# Patient Record
Sex: FEMALE | Race: BLACK OR AFRICAN AMERICAN
[De-identification: names, ages, dates, MRNs, and addresses within clinical notes are randomized per-mention and may not be internally consistent; named-entity substitution may affect disease eponyms.]

---

## 2021-12-16 ENCOUNTER — TRANSCRIPTION ENCOUNTER (OUTPATIENT)
Age: 76
End: 2021-12-16

## 2021-12-17 PROBLEM — Z00.00 ENCOUNTER FOR PREVENTIVE HEALTH EXAMINATION: Status: ACTIVE | Noted: 2021-12-17

## 2021-12-20 ENCOUNTER — EMERGENCY (EMERGENCY)
Facility: HOSPITAL | Age: 76
LOS: 0 days | Discharge: HOME | End: 2021-12-20
Attending: EMERGENCY MEDICINE
Payer: MEDICARE

## 2021-12-20 VITALS
HEART RATE: 94 BPM | SYSTOLIC BLOOD PRESSURE: 177 MMHG | OXYGEN SATURATION: 96 % | DIASTOLIC BLOOD PRESSURE: 67 MMHG | RESPIRATION RATE: 18 BRPM

## 2021-12-20 VITALS
HEART RATE: 105 BPM | RESPIRATION RATE: 18 BRPM | SYSTOLIC BLOOD PRESSURE: 197 MMHG | WEIGHT: 149.91 LBS | DIASTOLIC BLOOD PRESSURE: 72 MMHG | OXYGEN SATURATION: 96 % | HEIGHT: 64 IN | TEMPERATURE: 98 F

## 2021-12-20 DIAGNOSIS — Z88.0 ALLERGY STATUS TO PENICILLIN: ICD-10-CM

## 2021-12-20 DIAGNOSIS — E11.65 TYPE 2 DIABETES MELLITUS WITH HYPERGLYCEMIA: ICD-10-CM

## 2021-12-20 DIAGNOSIS — Z79.4 LONG TERM (CURRENT) USE OF INSULIN: ICD-10-CM

## 2021-12-20 PROCEDURE — 93010 ELECTROCARDIOGRAM REPORT: CPT

## 2021-12-20 PROCEDURE — 99283 EMERGENCY DEPT VISIT LOW MDM: CPT

## 2021-12-20 PROCEDURE — 99284 EMERGENCY DEPT VISIT MOD MDM: CPT

## 2021-12-20 PROCEDURE — 82962 GLUCOSE BLOOD TEST: CPT

## 2021-12-20 PROCEDURE — 93005 ELECTROCARDIOGRAM TRACING: CPT

## 2021-12-20 NOTE — ED STATDOCS - OBJECTIVE STATEMENT
75 y/o female visiting from [place] with a PMHx of DM on insulin presents to the ED for hyperglycemia. Pt was at urgent care on 12/16 for left arm pain and was placed on steroids. Pt reports hyperglycemia since yesterday. Denies fevers. No other complaints at this time.

## 2021-12-20 NOTE — ED STATDOCS - CLINICAL SUMMARY MEDICAL DECISION MAKING FREE TEXT BOX
Pt with elevated blood sugar secondary to recent steroids for arm pain. Will instruct pt on sliding scale for the next few days, return for worsening concerns.

## 2021-12-20 NOTE — ED STATDOCS - PATIENT PORTAL LINK FT
You can access the FollowMyHealth Patient Portal offered by [facility] by registering at the following website: [email or web address]. By joining [employer]’s FollowMyHealth portal, you will also be able to view your health information using other applications (apps) compatible with our system.

## 2021-12-20 NOTE — ED STATDOCS - PROGRESS NOTE DETAILS
77 y/o Female with HTN, IDDM presents to ED c./o noticing that her Glucose level was elevated over 300 today.   Pt reports that she went to  on 12/16 and was started on new meds for chronic shoulder pain.  Pt started a medrol dosepak and Valtaren gel.  Pt without symptoms of nausea, vomiting, dizziness ,polyuria, polydipsia, blurry vision, chest pain, fevers, open wounds.  Pt reports she feels fine other than sugar being elevated.  Explained to pt that elevated sugar is a result of Steroid medication that was started for her shoulder pain.  Pt usually takes 5Units of 70/30 Insulin BID.  With Obdulio Doan, we explained that she can take 6.5 Units BID today.  Then she can monitor sugar tomorrow.  Likely plan to return 5 Units BID.  Increase fluids.  Glucose will return to normal after finishing Steroids.  Josefina Eugene PA-C

## 2021-12-22 ENCOUNTER — APPOINTMENT (OUTPATIENT)
Dept: ORTHOPEDIC SURGERY | Facility: CLINIC | Age: 76
End: 2021-12-22
Payer: MEDICARE

## 2021-12-22 ENCOUNTER — NON-APPOINTMENT (OUTPATIENT)
Age: 76
End: 2021-12-22

## 2021-12-22 VITALS
DIASTOLIC BLOOD PRESSURE: 76 MMHG | HEIGHT: 63 IN | WEIGHT: 150 LBS | SYSTOLIC BLOOD PRESSURE: 197 MMHG | BODY MASS INDEX: 26.58 KG/M2 | HEART RATE: 67 BPM

## 2021-12-22 DIAGNOSIS — Z78.9 OTHER SPECIFIED HEALTH STATUS: ICD-10-CM

## 2021-12-22 DIAGNOSIS — Z86.39 PERSONAL HISTORY OF OTHER ENDOCRINE, NUTRITIONAL AND METABOLIC DISEASE: ICD-10-CM

## 2021-12-22 DIAGNOSIS — Z83.3 FAMILY HISTORY OF DIABETES MELLITUS: ICD-10-CM

## 2021-12-22 DIAGNOSIS — Z86.69 PERSONAL HISTORY OF OTHER DISEASES OF THE NERVOUS SYSTEM AND SENSE ORGANS: ICD-10-CM

## 2021-12-22 DIAGNOSIS — Z86.79 PERSONAL HISTORY OF OTHER DISEASES OF THE CIRCULATORY SYSTEM: ICD-10-CM

## 2021-12-22 DIAGNOSIS — M19.012 PRIMARY OSTEOARTHRITIS, LEFT SHOULDER: ICD-10-CM

## 2021-12-22 DIAGNOSIS — Z86.2 PERSONAL HISTORY OF DISEASES OF THE BLOOD AND BLOOD-FORMING ORGANS AND CERTAIN DISORDERS INVOLVING THE IMMUNE MECHANISM: ICD-10-CM

## 2021-12-22 PROBLEM — E11.9 TYPE 2 DIABETES MELLITUS WITHOUT COMPLICATIONS: Chronic | Status: ACTIVE | Noted: 2021-12-20

## 2021-12-22 PROCEDURE — 73030 X-RAY EXAM OF SHOULDER: CPT | Mod: LT

## 2021-12-22 PROCEDURE — 99203 OFFICE O/P NEW LOW 30 MIN: CPT

## 2021-12-23 ENCOUNTER — APPOINTMENT (OUTPATIENT)
Dept: NEUROSURGERY | Facility: CLINIC | Age: 76
End: 2021-12-23
Payer: MEDICARE

## 2021-12-23 ENCOUNTER — RESULT REVIEW (OUTPATIENT)
Age: 76
End: 2021-12-23

## 2021-12-23 VITALS
HEART RATE: 69 BPM | HEIGHT: 63 IN | SYSTOLIC BLOOD PRESSURE: 180 MMHG | OXYGEN SATURATION: 99 % | WEIGHT: 150 LBS | DIASTOLIC BLOOD PRESSURE: 90 MMHG | BODY MASS INDEX: 26.58 KG/M2 | TEMPERATURE: 96.8 F

## 2021-12-23 PROBLEM — Z86.69 HISTORY OF HEARING LOSS: Status: RESOLVED | Noted: 2021-12-23 | Resolved: 2021-12-23

## 2021-12-23 PROBLEM — Z86.39 HISTORY OF HIGH CHOLESTEROL: Status: RESOLVED | Noted: 2021-12-23 | Resolved: 2021-12-23

## 2021-12-23 PROBLEM — M19.012 PRIMARY OSTEOARTHRITIS OF LEFT SHOULDER: Status: ACTIVE | Noted: 2021-12-23

## 2021-12-23 PROBLEM — Z83.3 FAMILY HISTORY OF DIABETES MELLITUS: Status: ACTIVE | Noted: 2021-12-23

## 2021-12-23 PROBLEM — Z86.39 HISTORY OF DIABETES MELLITUS: Status: RESOLVED | Noted: 2021-12-23 | Resolved: 2021-12-23

## 2021-12-23 PROBLEM — Z86.2 HISTORY OF ANEMIA: Status: RESOLVED | Noted: 2021-12-23 | Resolved: 2021-12-23

## 2021-12-23 PROBLEM — Z78.9 NON-SMOKER: Status: ACTIVE | Noted: 2021-12-23

## 2021-12-23 PROBLEM — Z86.79 HISTORY OF HYPERTENSION: Status: RESOLVED | Noted: 2021-12-23 | Resolved: 2021-12-23

## 2021-12-23 PROCEDURE — 99204 OFFICE O/P NEW MOD 45 MIN: CPT

## 2021-12-23 NOTE — CONSULT LETTER
[Dear  ___] : Dear  [unfilled], [Courtesy Letter:] : I had the pleasure of seeing your patient, [unfilled], in my office today. [Sincerely,] : Sincerely, [FreeTextEntry2] : Jose L Hernandez MD\par [address]\[place] [address] [FreeTextEntry1] : Mrs. Alcala is a very pleasant 76-year-old female patient who was seen in our office today in regards to left-sided upper extremity symptoms.  The patient was accompanied by her daughter at today's visit.\par \par The patient is a somewhat difficult historian.  The patient does not recall specific details and the patient's daughter does try to help fill in some gaps.  The patient endorses arm swelling in October 2021.  The patient's daughter clarified that she had first noticed problems with her mother's arm at that time but suspects that it has been going on for several months before.  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  The patient denies any fevers or chills or any other recent infections.  The patient denies any other neurologic conditions outside of her right upper extremity.  The patient states that she has been having difficulty moving her entire arm including shoulder movements as well as elbow and wrist movements.  The patient states that he also experienced pain in numbness in the left arm but this has since resolved.  Currently, the patient complains primarily of weakness in the right upper extremity.  The patient denies any new headaches, facial weakness, or difficulties in the lower extremities bilaterally.\par \par The patient denies any medical issues, but her most recent hospital visit for hyperglycemia documents diabetes mellitus.  The patient states she does have allergies to penicillin, but she does not know the reaction.\par \par On examination, the patient is alert, oriented, and compliant with the exam.  The patient demonstrates full 5/5 strength in the upper extremity on the right as well as in the lower extremities bilaterally.  The patient demonstrates 2+ reflexes in the left upper extremity and at the patella and Achilles tendons bilaterally.  The patient ambulates well.  On examination of the left upper extremity, the patient has significant atrophy in the forearm and has little to no movement with wrist extension on the left.  The patient retains finger flexion though this is significantly weaker than the right.  The patient also has finger extension but is unable to fully extend her fingers and again she is weaker compared to the right.  The patient's bicep remains relatively strong rated at 4/5 on the left.  The patient shoulder abduction is rated at a 4/5 but the patient does have difficulty abducting her shoulder beyond 90 degrees.  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  However, given the history of trauma around the brachial plexus, lower motor neuron issue cannot fully be discounted.  As such, I have recommended MRI scans of the head and cervical spine in addition to the brachial plexus.  I have also requested electrophysiologic studies to evaluate nerve function peripherally.  Finally, because of the history of swelling I have also recommended evaluation for possible peripheral vascular disease, but my suspicion is low.  At this time, I have recommended follow-up with us as soon as her tests and images are complete so that we can evaluate them together and develop a targeted treatment plan.

## 2021-12-27 NOTE — HISTORY OF PRESENT ILLNESS
[de-identified] : The patient comes in today stating she had a fall in September and is having persistent complaints of pain to her left shoulder.  She has noticed subsequently, she is having pain radiating down her arm into her hand with some limited use of her hand.  The patient states the onset/injury occurred 09/01/2021.  This injury is not work related or due to an automobile accident.  The patient states the pain is intermittent.  The patient describes the pain as dull. [2] : a current pain level of 2/10 [de-identified] : lifting arm [de-identified] : massage

## 2021-12-27 NOTE — CONSULT LETTER
[Dear  ___] : Dear  [unfilled], [Referral Letter:] : I am referring [unfilled] to you for further evaluation.  My most recent evaluation follows. [Please see my note below.] : Please see my note below. [Referral Closing:] : Thank you very much for seeing this patient.  If you have any questions, please do not hesitate to contact me. [Sincerely,] : Sincerely, [FreeTextEntry3] : Osbaldo Camilo, III, MD\par

## 2021-12-27 NOTE — PHYSICAL EXAM
[Normal] : Gait: normal [de-identified] : Right Shoulder: \par Shoulder: Range of Motion in Degrees:   	\par    	                        Claimant:          Normal:  	\par Abduction (Active)  	180  	180 degrees  	\par Abduction (Passive)  	180  	180 degrees  	\par Forward elevation (Active):  	180  	180 degrees  	\par Forward elevation (Passive):  180  	180 degrees  	\par External rotation (Active):  	45  	45 degrees  	\par External rotation (Passive):  	45  	45 degrees  	\par Internal rotation (Active):  	L-1  	L-1  	\par Internal rotation (Passive):  	L-1  	L-1  	\par \par No motor weakness to internal rotation, external rotation or abduction in the scapular plane.  Negative crank test.  Negative O’Vin’s test.  Negative Speed’s test. Negative Yergason’s test.  Negative cross arm test.  No tenderness to palpation at the AC joint. Negative Hawkin’s sign.  Negative Neer’s sign.  Negative apprehension. Negative sulcus sign.  No gross neurological or vascular deficits distally.  Skin is intact.  No rashes, scars or lesions.  2+ radial and ulnar pulses.  No extra-articular swelling or tenderness. \par \par Left Shoulder: \par Shoulder: Range of Motion in Degrees:  	\par 	                                  Claimant:	Normal:	\par Abduction (Active)	                  180	               180 degrees	\par Abduction (Passive)	  180	               180 degrees	\par Forward elevation (Active):	  180	               180 degrees	\par Forward elevation (Passive):	  180	               180 degrees	\par External rotation (Active):	   45	               45 degrees	\par External rotation (Passive):	   45	               45 degrees	\par Internal rotation (Active):	   L-1	               L-1	\par Internal rotation (Passive):	   L-1	               L-1	\par \par Diffuse swelling, decreased sensation and limited motion into her hand.  Diffuse crepitations and tenderness throughout range of motion.  Pain and swelling throughout range of motion, more significant at extremes.  No motor weakness to internal rotation, external rotation or abduction in the scapular plane.  Negative crank test.  Negative O’Vin’s test.  Negative Speed’s test.  Negative Yergason’s test.  Negative cross arm test.  No tenderness to palpation at the AC joint.  Negative Hawkin’s sign.  Negative Neer’s sign.  Negative apprehension. Negative sulcus sign.  No gross neurological or vascular deficits distally.  Skin is intact.  No rashes, scars or lesions.  2+ radial and ulnar pulses. No extra-articular swelling or tenderness.\par  [de-identified] : Station:  Normal. [de-identified] : Appearance:  Well-developed, well-nourished female in no acute distress.\par   [de-identified] : Radiographs, two views of the left shoulder, show no obvious osseous abnormalities.

## 2021-12-27 NOTE — DISCUSSION/SUMMARY
[de-identified] : The patient presents with resolving arthritic complaints to her left shoulder.  I think her symptoms are more consistent with a radiculopathic process.  At this time, the patient will be referred for cervical spine evaluation.

## 2022-01-25 ENCOUNTER — APPOINTMENT (OUTPATIENT)
Dept: MRI IMAGING | Facility: CLINIC | Age: 77
End: 2022-01-25
Payer: MEDICARE

## 2022-01-25 ENCOUNTER — OUTPATIENT (OUTPATIENT)
Dept: OUTPATIENT SERVICES | Facility: HOSPITAL | Age: 77
LOS: 1 days | End: 2022-01-25
Payer: COMMERCIAL

## 2022-01-25 DIAGNOSIS — R29.898 OTHER SYMPTOMS AND SIGNS INVOLVING THE MUSCULOSKELETAL SYSTEM: ICD-10-CM

## 2022-01-25 PROCEDURE — 70551 MRI BRAIN STEM W/O DYE: CPT | Mod: 26

## 2022-01-25 PROCEDURE — 72141 MRI NECK SPINE W/O DYE: CPT

## 2022-01-25 PROCEDURE — 72141 MRI NECK SPINE W/O DYE: CPT | Mod: 26

## 2022-01-25 PROCEDURE — 70551 MRI BRAIN STEM W/O DYE: CPT

## 2022-01-27 ENCOUNTER — APPOINTMENT (OUTPATIENT)
Dept: MRI IMAGING | Facility: CLINIC | Age: 77
End: 2022-01-27
Payer: MEDICARE

## 2022-01-27 ENCOUNTER — APPOINTMENT (OUTPATIENT)
Dept: ULTRASOUND IMAGING | Facility: CLINIC | Age: 77
End: 2022-01-27
Payer: MEDICARE

## 2022-01-27 ENCOUNTER — OUTPATIENT (OUTPATIENT)
Dept: OUTPATIENT SERVICES | Facility: HOSPITAL | Age: 77
LOS: 1 days | End: 2022-01-27

## 2022-01-27 DIAGNOSIS — R29.898 OTHER SYMPTOMS AND SIGNS INVOLVING THE MUSCULOSKELETAL SYSTEM: ICD-10-CM

## 2022-01-27 PROCEDURE — 93931 UPPER EXTREMITY STUDY: CPT | Mod: 26,LT

## 2022-01-27 PROCEDURE — 71550 MRI CHEST W/O DYE: CPT | Mod: 26

## 2022-02-17 ENCOUNTER — APPOINTMENT (OUTPATIENT)
Dept: NEUROSURGERY | Facility: CLINIC | Age: 77
End: 2022-02-17
Payer: MEDICARE

## 2022-02-17 VITALS
SYSTOLIC BLOOD PRESSURE: 186 MMHG | HEART RATE: 84 BPM | OXYGEN SATURATION: 100 % | WEIGHT: 150 LBS | TEMPERATURE: 96.5 F | BODY MASS INDEX: 26.58 KG/M2 | DIASTOLIC BLOOD PRESSURE: 73 MMHG | HEIGHT: 63 IN

## 2022-02-17 DIAGNOSIS — R29.898 OTHER SYMPTOMS AND SIGNS INVOLVING THE MUSCULOSKELETAL SYSTEM: ICD-10-CM

## 2022-02-17 PROCEDURE — 99215 OFFICE O/P EST HI 40 MIN: CPT

## 2022-02-17 NOTE — CONSULT LETTER
[Dear  ___] : Dear  [unfilled], [Courtesy Letter:] : I had the pleasure of seeing your patient, [unfilled], in my office today. [Sincerely,] : Sincerely, [FreeTextEntry2] : Jose L Hernandez MD\par [address]\[place] [address] [FreeTextEntry1] : Mrs. Alcala is a very pleasant 76-year-old female patient who was seen in our office today in follow-up. The patient was previously assessed in regards to left-sided upper extremity symptoms including pain, weakness, and atrophy. The patient was accompanied by her daughter.\par \par The patient continues to be a somewhat difficult historian. The patient states that she noticed upper extremity symptoms starting around September 2021. The patient states that she had noticed a significant decrease in function over the last several months which has now been stable over the last several weeks. At the last visit, the patient was actually complaining of upper extremity symptoms in the left and the right, but at today's visit the patient states that all of her symptoms are on the left upper extremity. The patient states that her left sided symptoms have not changed significantly since her last visit 2 months ago. Based on the physical examination and her history, the patient was organized an MRI scan of the brain, brachial plexus, and cervical spine. These images were reviewed today.\par \par On examination, the patient remains alert, oriented, and compliant with the exam. The patient has some difficulty following discussion, but the patient's daughter is able to help her mother understand the situation. The patient continues to demonstrate full strength in the lower extremities bilaterally as well as the right upper extremity. The patient's left upper extremity continues to demonstrate atrophy in the forearm and essentially no movement with wrist extension on the left. The patient demonstrates 4/5 finger flexion and is unable to extend her fingers fully on the left. Elbow extension on the left and shoulder abduction on the left is rated at 4/5. The patient previously did not have a Hussein sign noted, but currently demonstrates a positive Hussein sign on the left but not the right. The patient is hyperreflexic in the left upper extremity. The patient ambulates well. The patient's lower extremity symptoms demonstrate 2+ reflexes.\par \par The patient is accompanied with an MRI scan of the brachial plexus on the left dated January 27, 2022 which returned unremarkable.  The patient's MRI scan of the brain performed on January 25, 2022 similarly returned unremarkable.  In the cervical spine MRI scans performed on January 25, 2022, the patient has evidence of severe degenerative disc disease and multilevel cervical stenosis.  These findings are worst at C4-C7 where there are large central disc herniations causing deformation of the spinal cord on the left. 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 At this time, I  explained to the patient she is a candidate for surgical intervention which, in my hands, would entail an anterior cervical decompression and fusion from C4-7. Given the small height of the C5 vertebral body and the extensive compression behind the C5 body, consideration would be given to C5 corpectomy and a C6/7 discectomy. The purpose of this procedure would be to arrest the progression of the patient's symptoms and provide the patient the best chance of recovery of function of the left arm. Again, the patient appears to have slight difficulty following the discussion and the patient would like to discuss surgery with her family. I think this is very reasonable at this time and I have recommended follow-up with us in approximately 2 weeks to reevaluate her progress. In the interim, I have recommended physical therapy for upper extremity strengthening exercises to mitigate muscle disuse and contractures. [FreeTextEntry3] : Richie Zavala MD, PhD, FRCPSC \par Attending Neurosurgeon \par [facility] \par [address]nd floor \[place] [address] \par Office: [phone] \par Fax: [phone]\par \par

## 2022-04-14 ENCOUNTER — APPOINTMENT (OUTPATIENT)
Dept: NEUROSURGERY | Facility: CLINIC | Age: 77
End: 2022-04-14
Payer: MEDICARE

## 2022-04-14 VITALS
SYSTOLIC BLOOD PRESSURE: 161 MMHG | OXYGEN SATURATION: 98 % | DIASTOLIC BLOOD PRESSURE: 81 MMHG | WEIGHT: 150 LBS | BODY MASS INDEX: 26.58 KG/M2 | TEMPERATURE: 97.2 F | HEART RATE: 78 BPM | HEIGHT: 63 IN

## 2022-04-14 PROCEDURE — 99215 OFFICE O/P EST HI 40 MIN: CPT

## 2022-04-14 NOTE — CONSULT LETTER
[Dear  ___] : Dear  [unfilled], [Courtesy Letter:] : I had the pleasure of seeing your patient, [unfilled], in my office today. [Sincerely,] : Sincerely, [FreeTextEntry2] : Jose L Hernandez MD\par [address]\[place] [address]  [FreeTextEntry1] : Mrs. Alcala is a very pleasant  76-year-old female patient who was seen in our office today in follow-up in regards to left-sided upper extremity symptoms.  The patient was again accompanied by her daughter at this visit.\par \par Briefly, the patient was previously assessed and diagnosed with cervical myelopathy and left-sided upper extremity radiculopathies secondary to multilevel disc herniations and cord compression in the cervical spine from C4-C7.  The patient was offered surgical management of this condition but wished to attempt physical therapy first to see if there would be any improvement in her upper extremity function.  It has been approximately 8 weeks since her last visit.  At this time, the patient states that she believes the left hand has improved somewhat though she remains quite disabled. The patient does not endorse any new symptoms in the right upper extremity or the lower extremities bilaterally.  \par \par On examination, the patient remains alert, oriented, and compliant with the exam.  The patient continues to demonstrate full strength in the lower extremities as well as the right upper extremity.  The patient's left upper extremity continues to demonstrate significant atrophy throughout the arm.  The patient's wrist extension is rated at 0/5.  The patient's finger flexion is rated at a 3/5.  The patient's elbow extension and shoulder abduction is rated at a 3/5.  The patient continues to have a Hussein sign on the left.  The patient is quite hyperreflexic in the left upper extremity rated at 3+.\par \par I have no new imaging to review today.  The patient's previous images from January 25, 2022 demonstrates severe degenerative disc disease from C4-7 causing cord compression and evidence of myelomalacia.\par \par Taken together, the patient's clinical situation remains stable.  The patient has not regained significant function in the left upper extremity and remains quite weak.  The patient continues to have pain radiating down the left arm.  I explained to the patient that surgical intervention remains an option for the patient which would entail a C5 corpectomy and a C6/7 anterior cervical decompression and fusion.  At this time, the patient would still like to defer surgical treatment which is her prerogative.  The patient's daughter has a firm understanding of the situation and I have reiterated to the patient that surgical intervention is designed to give the patient the best chance at recovering function in the left upper extremity but given the chronicity, is unlikely to restore full function in that upper extremity.  The patient has requested to follow-up with us on an as-needed basis at this time.

## 2022-07-07 ENCOUNTER — TRANSCRIPTION ENCOUNTER (OUTPATIENT)
Age: 77
End: 2022-07-07

## 2022-08-31 ENCOUNTER — OUTPATIENT (OUTPATIENT)
Dept: OUTPATIENT SERVICES | Facility: HOSPITAL | Age: 77
LOS: 1 days | End: 2022-08-31
Payer: MEDICARE

## 2022-08-31 DIAGNOSIS — D64.9 ANEMIA, UNSPECIFIED: ICD-10-CM

## 2022-08-31 PROCEDURE — 86850 RBC ANTIBODY SCREEN: CPT

## 2022-08-31 PROCEDURE — 86900 BLOOD TYPING SEROLOGIC ABO: CPT

## 2022-08-31 PROCEDURE — 36415 COLL VENOUS BLD VENIPUNCTURE: CPT

## 2022-08-31 PROCEDURE — 86901 BLOOD TYPING SEROLOGIC RH(D): CPT

## 2022-09-01 ENCOUNTER — OUTPATIENT (OUTPATIENT)
Dept: OUTPATIENT SERVICES | Facility: HOSPITAL | Age: 77
LOS: 1 days | End: 2022-09-01
Payer: MEDICARE

## 2022-09-01 VITALS
HEIGHT: 62 IN | OXYGEN SATURATION: 100 % | TEMPERATURE: 99 F | RESPIRATION RATE: 17 BRPM | WEIGHT: 126.1 LBS | DIASTOLIC BLOOD PRESSURE: 53 MMHG | HEART RATE: 83 BPM | SYSTOLIC BLOOD PRESSURE: 137 MMHG

## 2022-09-01 VITALS
DIASTOLIC BLOOD PRESSURE: 59 MMHG | TEMPERATURE: 98 F | SYSTOLIC BLOOD PRESSURE: 142 MMHG | HEART RATE: 72 BPM | OXYGEN SATURATION: 100 % | RESPIRATION RATE: 16 BRPM

## 2022-09-01 DIAGNOSIS — D64.9 ANEMIA, UNSPECIFIED: ICD-10-CM

## 2022-09-01 PROCEDURE — 36430 TRANSFUSION BLD/BLD COMPNT: CPT

## 2022-09-01 PROCEDURE — 86920 COMPATIBILITY TEST SPIN: CPT

## 2022-09-01 PROCEDURE — 86900 BLOOD TYPING SEROLOGIC ABO: CPT

## 2022-09-01 PROCEDURE — 86901 BLOOD TYPING SEROLOGIC RH(D): CPT

## 2022-09-01 PROCEDURE — P9016: CPT

## 2022-09-01 PROCEDURE — 36415 COLL VENOUS BLD VENIPUNCTURE: CPT

## 2022-09-01 RX ORDER — ACETAMINOPHEN 500 MG
650 TABLET ORAL ONCE
Refills: 0 | Status: COMPLETED | OUTPATIENT
Start: 2022-09-01 | End: 2022-09-01

## 2022-09-01 RX ORDER — DIPHENHYDRAMINE HCL 50 MG
25 CAPSULE ORAL ONCE
Refills: 0 | Status: COMPLETED | OUTPATIENT
Start: 2022-09-01 | End: 2022-09-01

## 2022-09-01 RX ADMIN — Medication 650 MILLIGRAM(S): at 08:39

## 2022-09-01 RX ADMIN — Medication 650 MILLIGRAM(S): at 08:40

## 2022-09-01 RX ADMIN — Medication 25 MILLIGRAM(S): at 08:39

## 2022-10-06 ENCOUNTER — APPOINTMENT (OUTPATIENT)
Dept: NEUROSURGERY | Facility: CLINIC | Age: 77
End: 2022-10-06

## 2022-10-06 VITALS — HEART RATE: 85 BPM | OXYGEN SATURATION: 97 % | DIASTOLIC BLOOD PRESSURE: 81 MMHG | SYSTOLIC BLOOD PRESSURE: 169 MMHG

## 2022-10-06 DIAGNOSIS — M62.58 MUSCLE WASTING AND ATROPHY, NOT ELSEWHERE CLASSIFIED, OTHER SITE: ICD-10-CM

## 2022-10-06 DIAGNOSIS — R29.898 OTHER SYMPTOMS AND SIGNS INVOLVING THE MUSCULOSKELETAL SYSTEM: ICD-10-CM

## 2022-10-06 DIAGNOSIS — M54.12 RADICULOPATHY, CERVICAL REGION: ICD-10-CM

## 2022-10-06 PROCEDURE — 99215 OFFICE O/P EST HI 40 MIN: CPT

## 2022-10-11 PROBLEM — M54.12 CERVICAL RADICULAR PAIN: Status: ACTIVE | Noted: 2021-12-23

## 2022-10-11 PROBLEM — R29.898 LEFT ARM WEAKNESS: Status: ACTIVE | Noted: 2022-01-27

## 2022-10-11 PROBLEM — M62.58 ARM MUSCLE ATROPHY: Status: ACTIVE | Noted: 2021-12-23

## 2022-10-11 NOTE — CONSULT LETTER
[Dear  ___] : Dear  [unfilled], [Courtesy Letter:] : I had the pleasure of seeing your patient, [unfilled], in my office today. [Sincerely,] : Sincerely, [FreeTextEntry2] : Jose L Hernandez MD [address] [facility] [address] [place] [address]   [FreeTextEntry1] : Ms. Alcala is a very pleasant 77-year-old female patient who was seen in our office today in follow-up.\par \par The patient has left-sided upper extremity weakness which is thought to be a result of a chronic left-sided multilevel radiculopathy.  The patient is a poor historian and it is not clear how long her weakness has been present.  According to the patient's daughter, the weakness has been present for at least a year or more.  At our previous visits, the patient had been offered surgical intervention to decompress the nerve roots to allow her the best chance of functional recovery but the patient has thus far defer this treatment.  The patient has been participating with physical therapy but has noticed that her upper extremity weakness has worsened.  The patient's daughter also has several concerns about her mother's cognition and memory and has requested a neurology consult.  The patient is also being investigated for a form of autoimmune anemia at another healthcare system.\par \par On examination, the patient is alert, oriented, and compliant with the exam.  The patient left-sided upper extremity exam continues to demonstrate 4/5 strength with shoulder abduction, but the patient is unable to abduct beyond 45 degrees.  The patient demonstrates 4/5 strength with elbow flexion on the left.  The patient demonstrates 0/5 strength with wrist extension on the left.  The patient demonstrates 2-3/5 finger flexion on the left.  The patient is able to extend her fingers slightly but not fully.  The patient does not have a Hussein sign but is hyperreflexic at the biceps, triceps, and brachioradialis tendons on the left.\par \par The patient is not accompanied with new imaging at this time.\par \par Taken together, the patient continues to demonstrate severe upper extremity weakness on the left most likely related to nerve root compression.  Given the presence of atrophy and a longstanding weakness, I explained to the patient surgical intervention remains an option but it is not certain how much functional recovery the patient might enjoy.  However, given that the patient is worsening, one of the goals of surgical intervention would be to at least arrest development of worsening weakness.  Unfortunately, at this time, I am unsure as to whether or not the patient fully appreciates her conversation.  The patient's daughter agrees and we are again deferring surgical intervention until the patient has an assessment by a neurologist for the possibility of dementia.  I have recommended follow-up with us after the patient's medical issues are better explored but I have reiterated to the patient and her family that she remains a surgical candidate at any time. [FreeTextEntry3] : Richie Zavala MD, PhD, CS, FAANS Attending Neurosurgeon  of Neurosurgery [facility] [employer] [facility] at [facility] at [address], [place] [address] Office: [phone] Fax: [phone]

## 2022-11-12 ENCOUNTER — INPATIENT (INPATIENT)
Facility: HOSPITAL | Age: 77
LOS: 10 days | Discharge: HOSPICE | DRG: 853 | End: 2022-11-23
Attending: INTERNAL MEDICINE | Admitting: INTERNAL MEDICINE
Payer: MEDICARE

## 2022-11-12 VITALS — WEIGHT: 119.93 LBS | HEIGHT: 62 IN

## 2022-11-12 PROCEDURE — 93010 ELECTROCARDIOGRAM REPORT: CPT

## 2022-11-12 PROCEDURE — 99285 EMERGENCY DEPT VISIT HI MDM: CPT

## 2022-11-12 RX ORDER — VANCOMYCIN HCL 1 G
750 VIAL (EA) INTRAVENOUS EVERY 12 HOURS
Refills: 0 | Status: DISCONTINUED | OUTPATIENT
Start: 2022-11-13 | End: 2022-11-13

## 2022-11-12 RX ORDER — AZTREONAM 2 G
VIAL (EA) INJECTION
Refills: 0 | Status: DISCONTINUED | OUTPATIENT
Start: 2022-11-13 | End: 2022-11-13

## 2022-11-12 RX ORDER — VANCOMYCIN HCL 1 G
VIAL (EA) INTRAVENOUS
Refills: 0 | Status: DISCONTINUED | OUTPATIENT
Start: 2022-11-13 | End: 2022-11-13

## 2022-11-12 RX ORDER — ACETAMINOPHEN 500 MG
650 TABLET ORAL ONCE
Refills: 0 | Status: DISCONTINUED | OUTPATIENT
Start: 2022-11-12 | End: 2022-11-12

## 2022-11-12 RX ORDER — SODIUM CHLORIDE 9 MG/ML
1700 INJECTION INTRAMUSCULAR; INTRAVENOUS; SUBCUTANEOUS ONCE
Refills: 0 | Status: COMPLETED | OUTPATIENT
Start: 2022-11-12 | End: 2022-11-12

## 2022-11-12 RX ORDER — ACETAMINOPHEN 500 MG
1000 TABLET ORAL ONCE
Refills: 0 | Status: COMPLETED | OUTPATIENT
Start: 2022-11-12 | End: 2022-11-12

## 2022-11-12 RX ORDER — VANCOMYCIN HCL 1 G
750 VIAL (EA) INTRAVENOUS ONCE
Refills: 0 | Status: COMPLETED | OUTPATIENT
Start: 2022-11-12 | End: 2022-11-13

## 2022-11-12 RX ADMIN — SODIUM CHLORIDE 1700 MILLILITER(S): 9 INJECTION INTRAMUSCULAR; INTRAVENOUS; SUBCUTANEOUS at 23:56

## 2022-11-12 NOTE — ED ADULT NURSE NOTE - NSIMPLEMENTINTERV_GEN_ALL_ED
Implemented All Fall Risk Interventions:  [place] to call system. Call bell, personal items and telephone within reach. Instruct patient to call for assistance. Room bathroom lighting operational. Non-slip footwear when patient is off stretcher. Physically safe environment: no spills, clutter or unnecessary equipment. Stretcher in lowest position, wheels locked, appropriate side rails in place. Provide visual cue, wrist band, yellow gown, etc. Monitor gait and stability. Monitor for mental status changes and reorient to person, place, and time. Review medications for side effects contributing to fall risk. Reinforce activity limits and safety measures with patient and family.

## 2022-11-12 NOTE — ED ADULT TRIAGE NOTE - CHIEF COMPLAINT QUOTE
patient presenting via wheelchair to ED with daughter c/o increasing weakness x1 week and fever starting today. tmax 101. daughter states patient is currently being followed by oncologist dr. chery after finding masses on liver, spleen and enlarged lymph nodes. patient had CT and PET scan earlier this week but did not receive results yet. no hx cancer. patient has pinched nerve on left shoulder, patient unable to move left arm at baseline. patient [place]. patient acting at her baseline in triage according to daughter.

## 2022-11-12 NOTE — ED ADULT NURSE NOTE - CHIEF COMPLAINT QUOTE
patient presenting via wheelchair to ED with daughter c/o increasing weakness x1 week and fever starting today. tmax 101. daughter states patient is currently being followed by oncologist dr. chery after finding masses on liver, spleen and enlarged lymph nodes. patient had CT and PET scan earlier this week but did not receive results yet. no hx cancer. patient has pinched nerve on left shoulder, patient unable to move left arm at baseline. patient [place]. patient acting at her baseline in triage according to daughter.

## 2022-11-13 DIAGNOSIS — D64.9 ANEMIA, UNSPECIFIED: ICD-10-CM

## 2022-11-13 LAB
ALBUMIN SERPL ELPH-MCNC: 1.3 G/DL — LOW (ref 3.3–5)
ALP SERPL-CCNC: 250 U/L — HIGH (ref 40–120)
ALT FLD-CCNC: 14 U/L — SIGNIFICANT CHANGE UP (ref 12–78)
ANION GAP SERPL CALC-SCNC: 3 MMOL/L — LOW (ref 5–17)
APPEARANCE UR: CLEAR — SIGNIFICANT CHANGE UP
APTT BLD: 32.6 SEC — SIGNIFICANT CHANGE UP (ref 27.5–35.5)
AST SERPL-CCNC: 38 U/L — HIGH (ref 15–37)
BASOPHILS # BLD AUTO: 0.05 K/UL — SIGNIFICANT CHANGE UP (ref 0–0.2)
BASOPHILS NFR BLD AUTO: 0.5 % — SIGNIFICANT CHANGE UP (ref 0–2)
BILIRUB SERPL-MCNC: 1 MG/DL — SIGNIFICANT CHANGE UP (ref 0.2–1.2)
BILIRUB UR-MCNC: NEGATIVE — SIGNIFICANT CHANGE UP
BUN SERPL-MCNC: 23 MG/DL — SIGNIFICANT CHANGE UP (ref 7–23)
CALCIUM SERPL-MCNC: 9.2 MG/DL — SIGNIFICANT CHANGE UP (ref 8.5–10.1)
CHLORIDE SERPL-SCNC: 107 MMOL/L — SIGNIFICANT CHANGE UP (ref 96–108)
CO2 SERPL-SCNC: 25 MMOL/L — SIGNIFICANT CHANGE UP (ref 22–31)
COLOR SPEC: YELLOW — SIGNIFICANT CHANGE UP
CREAT SERPL-MCNC: 0.73 MG/DL — SIGNIFICANT CHANGE UP (ref 0.5–1.3)
DIFF PNL FLD: NEGATIVE — SIGNIFICANT CHANGE UP
EGFR: 85 ML/MIN/1.73M2 — SIGNIFICANT CHANGE UP
EOSINOPHIL # BLD AUTO: 0.01 K/UL — SIGNIFICANT CHANGE UP (ref 0–0.5)
EOSINOPHIL NFR BLD AUTO: 0.1 % — SIGNIFICANT CHANGE UP (ref 0–6)
GLUCOSE SERPL-MCNC: 235 MG/DL — HIGH (ref 70–99)
GLUCOSE UR QL: NEGATIVE — SIGNIFICANT CHANGE UP
HCT VFR BLD CALC: 30.6 % — LOW (ref 34.5–45)
HGB BLD-MCNC: 10 G/DL — LOW (ref 11.5–15.5)
IMM GRANULOCYTES NFR BLD AUTO: 3.5 % — HIGH (ref 0–0.9)
INR BLD: 1.74 RATIO — HIGH (ref 0.88–1.16)
KETONES UR-MCNC: ABNORMAL
LACTATE SERPL-SCNC: 2 MMOL/L — SIGNIFICANT CHANGE UP (ref 0.7–2)
LACTATE SERPL-SCNC: 2.8 MMOL/L — HIGH (ref 0.7–2)
LEUKOCYTE ESTERASE UR-ACNC: ABNORMAL
LYMPHOCYTES # BLD AUTO: 1.03 K/UL — SIGNIFICANT CHANGE UP (ref 1–3.3)
LYMPHOCYTES # BLD AUTO: 10.1 % — LOW (ref 13–44)
MCHC RBC-ENTMCNC: 22.7 PG — LOW (ref 27–34)
MCHC RBC-ENTMCNC: 32.7 GM/DL — SIGNIFICANT CHANGE UP (ref 32–36)
MCV RBC AUTO: 69.4 FL — LOW (ref 80–100)
MONOCYTES # BLD AUTO: 1.68 K/UL — HIGH (ref 0–0.9)
MONOCYTES NFR BLD AUTO: 16.5 % — HIGH (ref 2–14)
NEUTROPHILS # BLD AUTO: 7.03 K/UL — SIGNIFICANT CHANGE UP (ref 1.8–7.4)
NEUTROPHILS NFR BLD AUTO: 69.3 % — SIGNIFICANT CHANGE UP (ref 43–77)
NITRITE UR-MCNC: NEGATIVE — SIGNIFICANT CHANGE UP
PH UR: 5 — SIGNIFICANT CHANGE UP (ref 5–8)
PLATELET # BLD AUTO: 220 K/UL — SIGNIFICANT CHANGE UP (ref 150–400)
POTASSIUM SERPL-MCNC: 4.7 MMOL/L — SIGNIFICANT CHANGE UP (ref 3.5–5.3)
POTASSIUM SERPL-SCNC: 4.7 MMOL/L — SIGNIFICANT CHANGE UP (ref 3.5–5.3)
PROT SERPL-MCNC: 7.7 GM/DL — SIGNIFICANT CHANGE UP (ref 6–8.3)
PROT UR-MCNC: 30 MG/DL
PROTHROM AB SERPL-ACNC: 20.3 SEC — HIGH (ref 10.5–13.4)
RAPID RVP RESULT: DETECTED
RBC # BLD: 4.41 M/UL — SIGNIFICANT CHANGE UP (ref 3.8–5.2)
RBC # FLD: 29.6 % — HIGH (ref 10.3–14.5)
RSV RNA SPEC QL NAA+PROBE: DETECTED
SARS-COV-2 RNA SPEC QL NAA+PROBE: SIGNIFICANT CHANGE UP
SODIUM SERPL-SCNC: 135 MMOL/L — SIGNIFICANT CHANGE UP (ref 135–145)
SP GR SPEC: 1.01 — SIGNIFICANT CHANGE UP (ref 1.01–1.02)
UROBILINOGEN FLD QL: 8
WBC # BLD: 10.16 K/UL — SIGNIFICANT CHANGE UP (ref 3.8–10.5)
WBC # FLD AUTO: 10.16 K/UL — SIGNIFICANT CHANGE UP (ref 3.8–10.5)

## 2022-11-13 PROCEDURE — 86140 C-REACTIVE PROTEIN: CPT

## 2022-11-13 PROCEDURE — 92523 SPEECH SOUND LANG COMPREHEN: CPT | Mod: GN

## 2022-11-13 PROCEDURE — 85025 COMPLETE CBC W/AUTO DIFF WBC: CPT

## 2022-11-13 PROCEDURE — 36415 COLL VENOUS BLD VENIPUNCTURE: CPT

## 2022-11-13 PROCEDURE — 99497 ADVNCD CARE PLAN 30 MIN: CPT | Mod: 25

## 2022-11-13 PROCEDURE — 99223 1ST HOSP IP/OBS HIGH 75: CPT

## 2022-11-13 PROCEDURE — 36430 TRANSFUSION BLD/BLD COMPNT: CPT

## 2022-11-13 PROCEDURE — 38505 NEEDLE BIOPSY LYMPH NODES: CPT

## 2022-11-13 PROCEDURE — 85027 COMPLETE CBC AUTOMATED: CPT

## 2022-11-13 PROCEDURE — 87040 BLOOD CULTURE FOR BACTERIA: CPT

## 2022-11-13 PROCEDURE — 92610 EVALUATE SWALLOWING FUNCTION: CPT | Mod: GN

## 2022-11-13 PROCEDURE — 82962 GLUCOSE BLOOD TEST: CPT

## 2022-11-13 PROCEDURE — 83036 HEMOGLOBIN GLYCOSYLATED A1C: CPT

## 2022-11-13 PROCEDURE — 97530 THERAPEUTIC ACTIVITIES: CPT | Mod: GP

## 2022-11-13 PROCEDURE — 85730 THROMBOPLASTIN TIME PARTIAL: CPT

## 2022-11-13 PROCEDURE — 80048 BASIC METABOLIC PNL TOTAL CA: CPT

## 2022-11-13 PROCEDURE — 83615 LACTATE (LD) (LDH) ENZYME: CPT

## 2022-11-13 PROCEDURE — 80053 COMPREHEN METABOLIC PANEL: CPT

## 2022-11-13 PROCEDURE — 88305 TISSUE EXAM BY PATHOLOGIST: CPT

## 2022-11-13 PROCEDURE — 85045 AUTOMATED RETICULOCYTE COUNT: CPT

## 2022-11-13 PROCEDURE — 76942 ECHO GUIDE FOR BIOPSY: CPT

## 2022-11-13 PROCEDURE — 83550 IRON BINDING TEST: CPT

## 2022-11-13 PROCEDURE — 85652 RBC SED RATE AUTOMATED: CPT

## 2022-11-13 PROCEDURE — 82728 ASSAY OF FERRITIN: CPT

## 2022-11-13 PROCEDURE — 83605 ASSAY OF LACTIC ACID: CPT

## 2022-11-13 PROCEDURE — 86803 HEPATITIS C AB TEST: CPT

## 2022-11-13 PROCEDURE — 97535 SELF CARE MNGMENT TRAINING: CPT | Mod: GP

## 2022-11-13 PROCEDURE — 97116 GAIT TRAINING THERAPY: CPT | Mod: GP

## 2022-11-13 PROCEDURE — 85610 PROTHROMBIN TIME: CPT

## 2022-11-13 PROCEDURE — 88172 CYTP DX EVAL FNA 1ST EA SITE: CPT

## 2022-11-13 PROCEDURE — 97163 PT EVAL HIGH COMPLEX 45 MIN: CPT | Mod: GP

## 2022-11-13 PROCEDURE — 83540 ASSAY OF IRON: CPT

## 2022-11-13 PROCEDURE — 80202 ASSAY OF VANCOMYCIN: CPT

## 2022-11-13 PROCEDURE — 71045 X-RAY EXAM CHEST 1 VIEW: CPT | Mod: 26

## 2022-11-13 PROCEDURE — 83010 ASSAY OF HAPTOGLOBIN QUANT: CPT

## 2022-11-13 PROCEDURE — 84550 ASSAY OF BLOOD/URIC ACID: CPT

## 2022-11-13 PROCEDURE — U0003: CPT

## 2022-11-13 PROCEDURE — P9016: CPT

## 2022-11-13 PROCEDURE — U0005: CPT

## 2022-11-13 RX ORDER — ACETAMINOPHEN 500 MG
650 TABLET ORAL EVERY 6 HOURS
Refills: 0 | Status: DISCONTINUED | OUTPATIENT
Start: 2022-11-13 | End: 2022-11-23

## 2022-11-13 RX ORDER — DEXTROSE 50 % IN WATER 50 %
12.5 SYRINGE (ML) INTRAVENOUS ONCE
Refills: 0 | Status: DISCONTINUED | OUTPATIENT
Start: 2022-11-13 | End: 2022-11-23

## 2022-11-13 RX ORDER — SODIUM CHLORIDE 9 MG/ML
1000 INJECTION, SOLUTION INTRAVENOUS
Refills: 0 | Status: DISCONTINUED | OUTPATIENT
Start: 2022-11-13 | End: 2022-11-23

## 2022-11-13 RX ORDER — ATENOLOL 25 MG/1
25 TABLET ORAL DAILY
Refills: 0 | Status: DISCONTINUED | OUTPATIENT
Start: 2022-11-13 | End: 2022-11-23

## 2022-11-13 RX ORDER — ONDANSETRON 8 MG/1
4 TABLET, FILM COATED ORAL EVERY 8 HOURS
Refills: 0 | Status: DISCONTINUED | OUTPATIENT
Start: 2022-11-13 | End: 2022-11-23

## 2022-11-13 RX ORDER — LANOLIN ALCOHOL/MO/W.PET/CERES
3 CREAM (GRAM) TOPICAL AT BEDTIME
Refills: 0 | Status: DISCONTINUED | OUTPATIENT
Start: 2022-11-13 | End: 2022-11-23

## 2022-11-13 RX ORDER — SODIUM CHLORIDE 9 MG/ML
1000 INJECTION INTRAMUSCULAR; INTRAVENOUS; SUBCUTANEOUS
Refills: 0 | Status: COMPLETED | OUTPATIENT
Start: 2022-11-13 | End: 2022-11-13

## 2022-11-13 RX ORDER — INSULIN LISPRO 100/ML
VIAL (ML) SUBCUTANEOUS
Refills: 0 | Status: DISCONTINUED | OUTPATIENT
Start: 2022-11-13 | End: 2022-11-23

## 2022-11-13 RX ORDER — INSULIN GLARGINE 100 [IU]/ML
5 INJECTION, SOLUTION SUBCUTANEOUS AT BEDTIME
Refills: 0 | Status: DISCONTINUED | OUTPATIENT
Start: 2022-11-13 | End: 2022-11-23

## 2022-11-13 RX ORDER — DEXTROSE 50 % IN WATER 50 %
15 SYRINGE (ML) INTRAVENOUS ONCE
Refills: 0 | Status: DISCONTINUED | OUTPATIENT
Start: 2022-11-13 | End: 2022-11-23

## 2022-11-13 RX ORDER — ACETAMINOPHEN 500 MG
650 TABLET ORAL ONCE
Refills: 0 | Status: COMPLETED | OUTPATIENT
Start: 2022-11-13 | End: 2022-11-13

## 2022-11-13 RX ORDER — FERROUS SULFATE 325(65) MG
325 TABLET ORAL DAILY
Refills: 0 | Status: DISCONTINUED | OUTPATIENT
Start: 2022-11-13 | End: 2022-11-23

## 2022-11-13 RX ORDER — DEXTROSE 50 % IN WATER 50 %
25 SYRINGE (ML) INTRAVENOUS ONCE
Refills: 0 | Status: DISCONTINUED | OUTPATIENT
Start: 2022-11-13 | End: 2022-11-23

## 2022-11-13 RX ORDER — AZTREONAM 2 G
2000 VIAL (EA) INJECTION EVERY 8 HOURS
Refills: 0 | Status: DISCONTINUED | OUTPATIENT
Start: 2022-11-13 | End: 2022-11-13

## 2022-11-13 RX ORDER — INSULIN LISPRO 100/ML
VIAL (ML) SUBCUTANEOUS AT BEDTIME
Refills: 0 | Status: DISCONTINUED | OUTPATIENT
Start: 2022-11-13 | End: 2022-11-23

## 2022-11-13 RX ORDER — AZTREONAM 2 G
2000 VIAL (EA) INJECTION ONCE
Refills: 0 | Status: COMPLETED | OUTPATIENT
Start: 2022-11-13 | End: 2022-11-13

## 2022-11-13 RX ORDER — GLUCAGON INJECTION, SOLUTION 0.5 MG/.1ML
1 INJECTION, SOLUTION SUBCUTANEOUS ONCE
Refills: 0 | Status: DISCONTINUED | OUTPATIENT
Start: 2022-11-13 | End: 2022-11-23

## 2022-11-13 RX ADMIN — Medication 3 MILLIGRAM(S): at 22:41

## 2022-11-13 RX ADMIN — Medication 400 MILLIGRAM(S): at 00:05

## 2022-11-13 RX ADMIN — Medication 2: at 13:14

## 2022-11-13 RX ADMIN — Medication 250 MILLIGRAM(S): at 00:05

## 2022-11-13 RX ADMIN — Medication 100 MILLIGRAM(S): at 01:42

## 2022-11-13 RX ADMIN — INSULIN GLARGINE 5 UNIT(S): 100 INJECTION, SOLUTION SUBCUTANEOUS at 22:14

## 2022-11-13 RX ADMIN — Medication 650 MILLIGRAM(S): at 18:11

## 2022-11-13 RX ADMIN — SODIUM CHLORIDE 50 MILLILITER(S): 9 INJECTION INTRAMUSCULAR; INTRAVENOUS; SUBCUTANEOUS at 12:02

## 2022-11-13 NOTE — ED PROVIDER NOTE - PHYSICAL EXAMINATION
Gen:  Frail appearing, in NAD  Head:  NC/AT  HEENT: pupils perrl,no pharyngeal erythema, uvula midline  Cardiac: S1S2, RRR  Abd: Soft, non tender  Resp: No distress, CTA   musculoskeletal:: no deformities, no swelling, strength +5/+5  Skin: warm and dry as visualized, no rashes  Neuro: GUZMAN, aao x 4  Psych:alert, cooperative, appropriate mood and affect for situation  Rectal: Guaiac positive. [address], 6/30/23

## 2022-11-13 NOTE — CONSULT NOTE ADULT - SUBJECTIVE AND OBJECTIVE BOX
HPI:  78 y/o F with pmhx with liver, splenic masses, enlarged lymph nodes, anemia being worked up for malignancy, L shoulder pinched nerve admitted to  with c/o of increasing weakness and fever to 101. Pt has followed up with oncology Dr. Lopez - she had recent CT and PET scan earlier this week. Here RVP positive for RVP, xray no obvious pna. UA pyuria. PCN allergy - unclear rxn. Febrile in , BP initially low and Hgb very low, repeat hgb improved. Was given vancomycin/aztreonam.        PMH: as above  PSH: as above  Meds: per reconciliation sheet, noted below  MEDICATIONS  (STANDING):  atenolol  Tablet 25 milliGRAM(s) Oral daily  dextrose 5%. 1000 milliLiter(s) (50 mL/Hr) IV Continuous <Continuous>  dextrose 5%. 1000 milliLiter(s) (100 mL/Hr) IV Continuous <Continuous>  dextrose 50% Injectable 25 Gram(s) IV Push once  dextrose 50% Injectable 12.5 Gram(s) IV Push once  dextrose 50% Injectable 25 Gram(s) IV Push once  ferrous    sulfate 325 milliGRAM(s) Oral daily  glucagon  Injectable 1 milliGRAM(s) IntraMuscular once  insulin glargine Injectable (LANTUS) 5 Unit(s) SubCutaneous at bedtime  insulin lispro (ADMELOG) corrective regimen sliding scale   SubCutaneous at bedtime  insulin lispro (ADMELOG) corrective regimen sliding scale   SubCutaneous three times a day before meals  sodium chloride 0.9%. 1000 milliLiter(s) (50 mL/Hr) IV Continuous <Continuous>    Allergies    penicillins (Unknown)    Intolerances      Social: no smoking, no alcohol, no illegal drugs; no recent travel, no exposure to TB  FAMILY HISTORY:     no history of premature cardiovascular disease in first degree relatives    ROS: unable to obtain d/t medical condition    All other systems reviewed and are negative    Vital Signs Last 24 Hrs  T(C): 38.3 (2022 15:48), Max: 38.7 (2022 22:11)  T(F): 100.9 (2022 15:48), Max: 101.6 (2022 22:11)  HR: 100 (2022 15:48) (65 - 106)  BP: 138/53 (2022 15:48) (91/46 - 138/53)  BP(mean): 77 (2022 09:17) (64 - 77)  RR: 18 (2022 15:48) (14 - 18)  SpO2: 98% (2022 15:48) (95% - 99%)    Parameters below as of 2022 15:48  Patient On (Oxygen Delivery Method): room air      Daily Height in cm: 157.48 (2022 21:57)    Daily     PE:  Constitutional: frail looking  HEENT: NC/AT, EOMI, PERRLA, conjunctivae clear; ears and nose atraumatic; pharynx benign  Neck: supple; thyroid not palpable  Back: no tenderness  Respiratory: decreased breath sounds   Cardiovascular: S1S2 regular, no murmurs  Abdomen: soft, not tender, not distended, positive BS; liver and spleen WNL  Genitourinary: no suprapubic tenderness  Lymphatic: no LN palpable  Musculoskeletal: no muscle tenderness, no joint swelling or tenderness  Extremities: no pedal edema  Neurological/ Psychiatric: AxOx3, Judgement and insight normal;  moving all extremities  Skin: no rashes; no palpable lesions    Labs: all available labs reviewed                        10.0   10.16 )-----------( 220      ( 2022 10:07 )             30.6     11-13    135  |  107  |  23  ----------------------------<  235<H>  4.7   |  25  |  0.73    Ca    9.2      2022 10:07    TPro  7.7  /  Alb  1.3<L>  /  TBili  1.0  /  DBili  x   /  AST  38<H>  /  ALT  14  /  AlkPhos  250<H>  11-13     LIVER FUNCTIONS - ( 2022 10:07 )  Alb: 1.3 g/dL / Pro: 7.7 gm/dL / ALK PHOS: 250 U/L / ALT: 14 U/L / AST: 38 U/L / GGT: x           Urinalysis Basic - ( 2022 22:55 )    Color: Yellow / Appearance: Clear / S.015 / pH: x  Gluc: x / Ketone: Trace  / Bili: Negative / Urobili: 8   Blood: x / Protein: 30 mg/dL / Nitrite: Negative   Leuk Esterase: Trace / RBC: 0-2 /HPF / WBC 3-5   Sq Epi: x / Non Sq Epi: Occasional / Bacteria: TNTC          Radiology: all available radiological tests reviewed    Advanced directives addressed: full resuscitation

## 2022-11-13 NOTE — SWALLOW BEDSIDE ASSESSMENT ADULT - SWALLOW EVAL: CRITERIA FOR SKILLED INTERVENTION MET
DO NOT FEEL THAT ACUTE SPEECH PATHOLOGY FOLLOW UP WOULD CHANGE CLINICAL MANAGEMENT/OUTCOME IN HOSPITAL SETTING. PT'S PRESBYPHAGIA AND COGNITIVE DYSFUNCTION ARE FELT TO BE CHRONIC/PRE-EXISTING. SUSPECT THAT SPEECH-LANGUAGE ABILITIES AND OROPHARYNGEAL SWALLOWING ARE AT USUAL STATE.  PT WOULD NOT BE A VIABLE THERAPY CANDIDATE REGARDLESS GIVEN THE SEVERITY OF HER CHRONIC COGNITIVE DYSFUNCTION DUE TO DEMENTIA. GIVEN ABOVE, THIS SERVICE WILL NOT ACTIVELY FOLLOW. RECONSULT PRN SHOULD STATUS CHANGE AND CONDITION WARRANT.

## 2022-11-13 NOTE — SWALLOW BEDSIDE ASSESSMENT ADULT - PHARYNGEAL PHASE
Swallow triggered in a grossly acceptable time frame for age. Laryngeal lift on palpation during swallowing trials was very mildly decreased but felt to be functional for age as well. NO behavioral aspiration signs exhibited. Odynophagia denied. No change in O2 sats noted.

## 2022-11-13 NOTE — SWALLOW BEDSIDE ASSESSMENT ADULT - ORAL PHASE
Bolus formation/transfer were mildly+ prolonged but mechanically functional for age without evidence of a abdi focality. Piecemeal deglutition was evident which is age acceptable.  Pt cleared oral debris after age acceptable piecemeal deglutition.

## 2022-11-13 NOTE — H&P ADULT - NSHPLABSRESULTS_GEN_ALL_CORE
10.0   10.16 )-----------( 220      ( 2022 10:07 )             30.6       CBC Full  -  ( 2022 10:07 )  WBC Count : 10.16 K/uL  RBC Count : 4.41 M/uL  Hemoglobin : 10.0 g/dL  Hematocrit : 30.6 %  Platelet Count - Automated : 220 K/uL  Mean Cell Volume : 69.4 fl  Mean Cell Hemoglobin : 22.7 pg  Mean Cell Hemoglobin Concentration : 32.7 gm/dL  Auto Neutrophil # : 7.03 K/uL  Auto Lymphocyte # : 1.03 K/uL  Auto Monocyte # : 1.68 K/uL  Auto Eosinophil # : 0.01 K/uL  Auto Basophil # : 0.05 K/uL  Auto Neutrophil % : 69.3 %  Auto Lymphocyte % : 10.1 %  Auto Monocyte % : 16.5 %  Auto Eosinophil % : 0.1 %  Auto Basophil % : 0.5 %          135  |  107  |  23  ----------------------------<  235<H>  4.7   |  25  |  0.73    Ca    9.2      2022 10:07    TPro  7.7  /  Alb  1.3<L>  /  TBili  1.0  /  DBili  x   /  AST  38<H>  /  ALT  14  /  AlkPhos  250<H>  11-13      LIVER FUNCTIONS - ( 2022 10:07 )  Alb: 1.3 g/dL / Pro: 7.7 gm/dL / ALK PHOS: 250 U/L / ALT: 14 U/L / AST: 38 U/L / GGT: x             PT/INR - ( 2022 10:07 )   PT: 20.3 sec;   INR: 1.74 ratio         PTT - ( 2022 10:07 )  PTT:32.6 sec          Urinalysis Basic - ( 2022 22:55 )    Color: Yellow / Appearance: Clear / S.015 / pH: x  Gluc: x / Ketone: Trace  / Bili: Negative / Urobili: 8   Blood: x / Protein: 30 mg/dL / Nitrite: Negative   Leuk Esterase: Trace / RBC: 0-2 /HPF / WBC 3-5   Sq Epi: x / Non Sq Epi: Occasional / Bacteria: TNTC            MEDICATIONS  (STANDING):  atenolol  Tablet 25 milliGRAM(s) Oral daily  dextrose 5%. 1000 milliLiter(s) (50 mL/Hr) IV Continuous <Continuous>  dextrose 5%. 1000 milliLiter(s) (100 mL/Hr) IV Continuous <Continuous>  dextrose 50% Injectable 25 Gram(s) IV Push once  dextrose 50% Injectable 12.5 Gram(s) IV Push once  dextrose 50% Injectable 25 Gram(s) IV Push once  ferrous    sulfate 325 milliGRAM(s) Oral daily  glucagon  Injectable 1 milliGRAM(s) IntraMuscular once  insulin glargine Injectable (LANTUS) 5 Unit(s) SubCutaneous at bedtime  insulin lispro (ADMELOG) corrective regimen sliding scale   SubCutaneous three times a day before meals  insulin lispro (ADMELOG) corrective regimen sliding scale   SubCutaneous at bedtime  sodium chloride 0.9%. 1000 milliLiter(s) (50 mL/Hr) IV Continuous <Continuous>

## 2022-11-13 NOTE — H&P ADULT - ASSESSMENT
# Acute on chronic severe anemia   # hx liver and spleenmass and lung and abdominal lymphadenopathy MRI abd/chest done in Oct 2022  # Coagulopathy , not on blood thinners   no evidence of gross bleed  s/p 2 units PRBC in ED, now Hb 10  FOBT  consult with miri  will dw with Dr chery re results of MRI with con brain and PET scan done outpt 11/9/22    # fever with lactatemia - sepsis present on admission  suspect from RSV URI   blood cx , ucx pending   continue abx aztreonam and vanco until results of cultures available  CXR no PNA, UA neg, no abd tenderness, has had multiple scans recently as outpatient will obtain results from Dr chery   ID consult     #  Acute metabolic encephalopathy   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 dysphagia    sees [facility] neurology  MRI brain IV con done 11/9 as outpatient -  swallow eval    #Hx HTN, DM , anemia on iron pills   continue home meds   held lisinopril   novolin changed to lantus inpatient,     # scd for vte prophylaxis   INR 2   # Acute on chronic severe anemia   # hx liver and spleenmass and lung and abdominal lymphadenopathy MRI abd/chest done in Oct 2022  # Coagulopathy , not on blood thinners   no evidence of gross bleed  s/p 2 units PRBC in ED, now Hb 10  FOBT  consult with miri  will dw with Dr chery re results of MRI with con brain and PET scan done outpt 11/9/22    # fever with lactatemia - sepsis present on admission  suspect from RSV URI   blood cx , ucx pending   continue abx aztreonam and vanco until results of cultures available  CXR no PNA, UA neg, no abd tenderness, has had multiple scans recently as outpatient will obtain results from Dr chery   ID consult     #  Acute metabolic encephalopathy /suspect underlying dementia  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 dysphagia    sees [facility] neurology  MRI brain IV con done 11/9 as outpatient -  swallow eval    #Hx HTN, DM , anemia on iron pills   continue home meds   held lisinopril   novolin changed to lantus inpatient,     # scd for vte prophylaxis   INR 2    pt is full code   i dw with daughter mark oconnell [phone]     # Acute on chronic severe anemia   # hx liver and spleenmass and lung and abdominal lymphadenopathy MRI abd/chest done in Oct 2022  # Coagulopathy , not on blood thinners   no evidence of gross bleed  s/p 2 units PRBC in ED, now Hb 10  FOBT  consult with miri  will dw with Dr chery re results of MRI with con brain and PET scan done outpt 11/9/22    # fever with lactatemia - sepsis present on admission  suspect from RSV URI   blood cx , ucx pending   continue abx aztreonam and vanco until results of cultures available  CXR no PNA, UA neg, no abd tenderness, has had multiple scans recently as outpatient will obtain results from Dr chery   ID consult     #  Acute metabolic encephalopathy /suspect underlying dementia  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 dysphagia    sees [facility] neurology  MRI brain IV con done 11/9 as outpatient -  swallow eval    #Hx HTN, DM , anemia on iron pills   continue home meds   held lisinopril   novolin changed to lantus inpatient,     # scd for vte prophylaxis   INR 2    pt is full code- advanced care directive time spent 20mins    i dw with daughter mark oconnell [phone]   details…

## 2022-11-13 NOTE — ED PROVIDER NOTE - OBJECTIVE STATEMENT
/// 78 y/o female presents to the ED c/o generalized weakness and palpitations. Pt with Hx of anemia and has felt similarly in the past when she was anemic.

## 2022-11-13 NOTE — CONSULT NOTE ADULT - ASSESSMENT
76 y/o F with pmhx with liver, splenic masses, enlarged lymph nodes, anemia being worked up for malignancy, L shoulder pinched nerve admitted to  with c/o of increasing weakness and fever to 101. Pt has followed up with oncology Dr. Lopez - she had recent CT and PET scan earlier this week. Here RVP positive for RVP, xray no obvious pna. UA pyuria. PCN allergy - unclear rxn. Febrile in , BP initially low and Hgb very low, repeat hgb improved. Was given vancomycin/aztreonam.    1. Fever. Viral syndrome. RSV. PCN allergy   - no superimposed pna  - observe off antibiotics  - monitor temps  - f/u urine cx, blood cx  - monitor temps  - supportive care  - fu cbc    2. other issues - care per medicine   -

## 2022-11-13 NOTE — H&P ADULT - HISTORY OF PRESENT ILLNESS
76 y/o F with pmhx 76 y/o F with pmhx IDDM , HTN, anemia , liver and splenic mass with lung and intrabdominal lymphadenopathy , chronic left upper extremity paresis/contracture seen by Dr ray neurosurgery 2021 and found to have spinal nerve compression at that time - spinal fusion recommended but patient an daughter declined surgery .patient    patient was brought in by daughter from home for fever 101 at home day PTA and 2 days of lethargy , decreased po intake, dark urine , for the past two days , has not been taking her meds, she spits out her meds    Patient is under care of [facility] Dr Lopez since 2022 for anemia - , had MRI abd/chest in Oct which showed liver and splenic mass with lung and intra-abdominal lymphadenopathy , on 22 had PET scan outpt - plan was to follow up with Dr lopez last week for arranging tissue biopsy however missed appt.    Also since 2022 patient is under care of [facility] neurology for worsening mentation and abnormal gait, balance issues  and issues with speech as per daughter  she was supposed to have EEG last month which was missed , patient had MRI brain with IV contrast at [facility] outpt  22- result unknown    in ED fever 101, RSV positive received 30cc/kg IVF bolus, received 2 units PRBC for hb 5.8     pshx catarct surgery 10 yrs ago  social hx lives with daughter nonsmoker, no etoh or recreational drug use   family hx mother - diabtes mellitus  siblings asthma   no family hx of stroke or cancer

## 2022-11-13 NOTE — SWALLOW BEDSIDE ASSESSMENT ADULT - ADDITIONAL RECOMMENDATIONS
HOSPITALIST F/U. QUESTION IF PT WITH MALIGNANCY. CONSIDER POTENTIAL BENEFIT OF A PALLIATIVE CARE CONSULT.

## 2022-11-13 NOTE — SWALLOW BEDSIDE ASSESSMENT ADULT - ORAL PREPARATORY PHASE
Pt willingly accepted PO. She tended to accept food in small volumes but labial grading on utensils was felt to be functional.

## 2022-11-13 NOTE — PATIENT PROFILE ADULT - FALL HARM RISK - HARM RISK INTERVENTIONS
COVID-19 PCR test completed. Patient handout For Patients Who Have Been Tested for Covid-19 (Coronavirus) was given to the patient, which includes test result notification process.    
Assistance with ambulation/Assistance OOB with selected safe patient handling equipment/Communicate Risk of Fall with Harm to all staff/Discuss with provider need for PT consult/Monitor gait and stability/Provide patient with walking aids - walker, cane, crutches/Reinforce activity limits and safety measures with patient and family/Tailored Fall Risk Interventions/Visual Cue: Yellow wristband and red socks/Bed in lowest position, wheels locked, appropriate side rails in place/Call bell, personal items and telephone in reach/Instruct patient to call for assistance before getting out of bed or chair/Non-slip footwear when patient is out of bed/[place] to call system/Physically safe environment - no spills, clutter or unnecessary equipment/Purposeful Proactive Rounding/Room/bathroom lighting operational, light cord in reach

## 2022-11-13 NOTE — H&P ADULT - NSHPPHYSICALEXAM_GEN_ALL_CORE
PHYSICAL EXAM:    Daily Height in cm: 157.48 (12 Nov 2022 21:57)    Daily     ICU Vital Signs Last 24 Hrs  T(C): 38.3 (13 Nov 2022 15:48), Max: 38.7 (12 Nov 2022 22:11)  T(F): 100.9 (13 Nov 2022 15:48), Max: 101.6 (12 Nov 2022 22:11)  HR: 100 (13 Nov 2022 15:48) (65 - 106)  BP: 138/53 (13 Nov 2022 15:48) (91/46 - 138/53)  BP(mean): 77 (13 Nov 2022 09:17) (64 - 77)  ABP: --  ABP(mean): --  RR: 18 (13 Nov 2022 15:48) (14 - 18)  SpO2: 98% (13 Nov 2022 15:48) (95% - 99%)    O2 Parameters below as of 13 Nov 2022 15:48  Patient On (Oxygen Delivery Method): room air            Constitutional:NAD  HEENT: Atraumatic, PHILIPPE, Normal, No congestion  Respiratory: Breath Sounds normal, no rhonchi/wheeze  Cardiovascular: N S1S2;   Gastrointestinal: Abdomen soft, non tender, Bowel Ssounds present  Extremities: No edema,   Neurological: awake, confused , oriented to self , chronic left upper extremity contracture/paresis , rest of the extremities 4/5 motor , follows 1 step commands , no gross facial asymmetry  Skin: Non cellulitic, no rash, ulcers

## 2022-11-13 NOTE — SWALLOW BEDSIDE ASSESSMENT ADULT - SLP GENERAL OBSERVATIONS
On encounter, pt's LUE was contracted and & RUE tremors were demonstrated at rest at times.  The pt was alert and interactive. However, she seemed somewhat [place] and was variably distractible. Pt was able to verbalize during communicative probes at times. When pt verbalized, her motor speech abilities were felt to be functional and her verbalizations were linguistically intact. Of note is that her utterances were variably tangential and variably reflective of decreased memory/insight indicative of Cognitive Dysfunction. I suspect that Cognitive Dysfunction with chronic component due to Dementia.

## 2022-11-13 NOTE — SWALLOW BEDSIDE ASSESSMENT ADULT - COMMENTS
Pt admitted to . Hospital course notable for fever, elevated lactate, RSV, coagulopathy and anemia status post transfusion. This profile is superimposed upon a history of Dementia, HTN, radiculopathy and LUE contracture(? past CVA). Pt admitted to . Hospital course notable for fever, elevated lactate, RSV(without pneumonia), coagulopathy and anemia status post transfusion. This profile is superimposed upon a history of Dementia, HTN, radiculopathy and LUE contracture(? past CVA). Additionally, pt has a history of liver/spleen/lung masses with abdominal lymphadenopathy.

## 2022-11-13 NOTE — SWALLOW BEDSIDE ASSESSMENT ADULT - ASPIRATION PRECAUTIONS
Maintain aspiration precautions as a conservative measure. Note that while Presbyphagia may place pt at a relatively heightened risk for episodic aspiration, she did not demonstrate any behavioral aspiration signs on exam./yes

## 2022-11-13 NOTE — SWALLOW BEDSIDE ASSESSMENT ADULT - SWALLOW EVAL: PROGNOSIS
2) The pt was alert and interactive. However, she seemed somewhat [place] and was variably distractible. Pt was able to verbalize during communicative probes at times. When pt verbalized, her motor speech abilities were felt to be functional and her verbalizations were linguistically intact. Of note is that her utterances were variably tangential and variably reflective of decreased memory/insight indicative of Cognitive Dysfunction. I suspect that Cognitive Dysfunction with chronic component due to Dementia.

## 2022-11-14 LAB
A1C WITH ESTIMATED AVERAGE GLUCOSE RESULT: 6 % — HIGH (ref 4–5.6)
ALBUMIN SERPL ELPH-MCNC: 1.2 G/DL — LOW (ref 3.3–5)
ALP SERPL-CCNC: 216 U/L — HIGH (ref 40–120)
ALT FLD-CCNC: 11 U/L — LOW (ref 12–78)
ANION GAP SERPL CALC-SCNC: 8 MMOL/L — SIGNIFICANT CHANGE UP (ref 5–17)
AST SERPL-CCNC: 30 U/L — SIGNIFICANT CHANGE UP (ref 15–37)
BASOPHILS # BLD AUTO: 0.02 K/UL — SIGNIFICANT CHANGE UP (ref 0–0.2)
BASOPHILS NFR BLD AUTO: 0.2 % — SIGNIFICANT CHANGE UP (ref 0–2)
BILIRUB SERPL-MCNC: 0.8 MG/DL — SIGNIFICANT CHANGE UP (ref 0.2–1.2)
BUN SERPL-MCNC: 19 MG/DL — SIGNIFICANT CHANGE UP (ref 7–23)
CALCIUM SERPL-MCNC: 9.4 MG/DL — SIGNIFICANT CHANGE UP (ref 8.5–10.1)
CHLORIDE SERPL-SCNC: 110 MMOL/L — HIGH (ref 96–108)
CO2 SERPL-SCNC: 24 MMOL/L — SIGNIFICANT CHANGE UP (ref 22–31)
CREAT SERPL-MCNC: 0.62 MG/DL — SIGNIFICANT CHANGE UP (ref 0.5–1.3)
EGFR: 92 ML/MIN/1.73M2 — SIGNIFICANT CHANGE UP
EOSINOPHIL # BLD AUTO: 0 K/UL — SIGNIFICANT CHANGE UP (ref 0–0.5)
EOSINOPHIL NFR BLD AUTO: 0 % — SIGNIFICANT CHANGE UP (ref 0–6)
ESTIMATED AVERAGE GLUCOSE: 126 MG/DL — HIGH (ref 68–114)
GLUCOSE SERPL-MCNC: 157 MG/DL — HIGH (ref 70–99)
HAPTOGLOB SERPL-MCNC: 226 MG/DL — HIGH (ref 34–200)
HCT VFR BLD CALC: 26 % — LOW (ref 34.5–45)
HCV AB S/CO SERPL IA: 0.16 S/CO — SIGNIFICANT CHANGE UP (ref 0–0.99)
HCV AB SERPL-IMP: SIGNIFICANT CHANGE UP
HGB BLD-MCNC: 8.6 G/DL — LOW (ref 11.5–15.5)
IMM GRANULOCYTES NFR BLD AUTO: 2.1 % — HIGH (ref 0–0.9)
INR BLD: 2.21 RATIO — HIGH (ref 0.88–1.16)
LYMPHOCYTES # BLD AUTO: 1.04 K/UL — SIGNIFICANT CHANGE UP (ref 1–3.3)
LYMPHOCYTES # BLD AUTO: 9.9 % — LOW (ref 13–44)
MCHC RBC-ENTMCNC: 22.1 PG — LOW (ref 27–34)
MCHC RBC-ENTMCNC: 33.1 GM/DL — SIGNIFICANT CHANGE UP (ref 32–36)
MCV RBC AUTO: 66.8 FL — LOW (ref 80–100)
MONOCYTES # BLD AUTO: 1.58 K/UL — HIGH (ref 0–0.9)
MONOCYTES NFR BLD AUTO: 15 % — HIGH (ref 2–14)
NEUTROPHILS # BLD AUTO: 7.68 K/UL — HIGH (ref 1.8–7.4)
NEUTROPHILS NFR BLD AUTO: 72.8 % — SIGNIFICANT CHANGE UP (ref 43–77)
PLATELET # BLD AUTO: 225 K/UL — SIGNIFICANT CHANGE UP (ref 150–400)
POTASSIUM SERPL-MCNC: 3.8 MMOL/L — SIGNIFICANT CHANGE UP (ref 3.5–5.3)
POTASSIUM SERPL-SCNC: 3.8 MMOL/L — SIGNIFICANT CHANGE UP (ref 3.5–5.3)
PROT SERPL-MCNC: 7 GM/DL — SIGNIFICANT CHANGE UP (ref 6–8.3)
PROTHROM AB SERPL-ACNC: 25.8 SEC — HIGH (ref 10.5–13.4)
RBC # BLD: 3.89 M/UL — SIGNIFICANT CHANGE UP (ref 3.8–5.2)
RBC # BLD: 3.89 M/UL — SIGNIFICANT CHANGE UP (ref 3.8–5.2)
RBC # FLD: 29.7 % — HIGH (ref 10.3–14.5)
RETICS #: 69.1 K/UL — SIGNIFICANT CHANGE UP (ref 25–125)
RETICS/RBC NFR: 1.8 % — SIGNIFICANT CHANGE UP (ref 0.5–2.5)
SODIUM SERPL-SCNC: 142 MMOL/L — SIGNIFICANT CHANGE UP (ref 135–145)
WBC # BLD: 10.54 K/UL — HIGH (ref 3.8–10.5)
WBC # FLD AUTO: 10.54 K/UL — HIGH (ref 3.8–10.5)

## 2022-11-14 PROCEDURE — 99232 SBSQ HOSP IP/OBS MODERATE 35: CPT

## 2022-11-14 PROCEDURE — 99497 ADVNCD CARE PLAN 30 MIN: CPT

## 2022-11-14 RX ORDER — VANCOMYCIN HCL 1 G
750 VIAL (EA) INTRAVENOUS EVERY 12 HOURS
Refills: 0 | Status: DISCONTINUED | OUTPATIENT
Start: 2022-11-14 | End: 2022-11-20

## 2022-11-14 RX ORDER — FERROUS SULFATE 325(65) MG
1 TABLET ORAL
Qty: 0 | Refills: 0 | DISCHARGE

## 2022-11-14 RX ORDER — INSULIN NPH HUM/REG INSULIN HM 70-30/ML
5 VIAL (ML) SUBCUTANEOUS
Qty: 0 | Refills: 0 | DISCHARGE

## 2022-11-14 RX ORDER — SODIUM CHLORIDE 9 MG/ML
1000 INJECTION, SOLUTION INTRAVENOUS
Refills: 0 | Status: DISCONTINUED | OUTPATIENT
Start: 2022-11-14 | End: 2022-11-18

## 2022-11-14 RX ORDER — ATENOLOL 25 MG/1
1 TABLET ORAL
Qty: 0 | Refills: 0 | DISCHARGE

## 2022-11-14 RX ADMIN — Medication 2: at 16:49

## 2022-11-14 RX ADMIN — SODIUM CHLORIDE 50 MILLILITER(S): 9 INJECTION, SOLUTION INTRAVENOUS at 17:32

## 2022-11-14 RX ADMIN — Medication 650 MILLIGRAM(S): at 08:22

## 2022-11-14 RX ADMIN — ATENOLOL 25 MILLIGRAM(S): 25 TABLET ORAL at 16:03

## 2022-11-14 RX ADMIN — INSULIN GLARGINE 5 UNIT(S): 100 INJECTION, SOLUTION SUBCUTANEOUS at 22:40

## 2022-11-14 RX ADMIN — Medication 325 MILLIGRAM(S): at 16:04

## 2022-11-14 RX ADMIN — Medication 650 MILLIGRAM(S): at 23:54

## 2022-11-14 RX ADMIN — Medication 250 MILLIGRAM(S): at 23:06

## 2022-11-14 NOTE — PHYSICAL THERAPY INITIAL EVALUATION ADULT - ACTIVE RANGE OF MOTION EXAMINATION, REHAB EVAL
Left UE unable to fully assess. Bilateral DF neutral. Noted left UE deformity/contracture./Right UE Active ROM was WFL (within functional limits)/Right LE Active ROM was WFL (within functional limits)/bilateral  lower extremity Active ROM was WFL (within functional limits)

## 2022-11-14 NOTE — CONSULT NOTE ADULT - SUBJECTIVE AND OBJECTIVE BOX
HPI:  78 y/o F with pmhx IDDM , HTN, anemia , liver and splenic mass with lung and intrabdominal lymphadenopathy , chronic left upper extremity paresis/contracture seen by Dr ray neurosurgery 2021 and found to have spinal nerve compression at that time - spinal fusion recommended but patient an daughter declined surgery brought in by daughter from home for fever 101 at home day PTA and 2 days of lethargy , decreased po intake, dark urine , for the past two days , has not been taking her meds, with increased confusion.    Since 2022 patient is under care of [facility] neurology for worsening mentation and abnormal gait, balance issues  and issues with speech as per daughter  she was supposed to have EEG last month which was missed    Patient has been under care of heme- onc Dr Lopez since 2022 for anemia - , had MRI abd/chest in Oct which showed liver and splenic mass with lung and intra-abdominal lymphadenopathy , on 22 had PET scan outpt - plan was to follow up with me in office  last week for arranging tissue biopsy however missed appt.    10/26 CT abd/pelvis with multiple masses in liver and spleen with the largest appearing to reside within segment 3, measuring approximately 4.5 cm in diameter. Multiple low­density lesions are demonstrated throughout the spleen, measuring up to approximately 2.6 cm in diameter. Portions of periportal lymphadenopathy reside adjacent to the pancreatic head, hampering its assessment. There is a mild degree of right­sided hydronephrosis and a mild to moderate degree of left­sided hydronephrosis, with the ureters resuming normal caliber at the level of the upper portions of the pelvis. Lymphadenopathy is demonstrated throughout the retroperitoneum, measuring up to at least 4.5 cm transverse diameter. In addition, periportal lymphadenopathy is demonstrated, measuring up to approximately 3.4 cm in diameter. No bone lesions    10/26­ CT chest ­ A focus of groundglass opacity posteriorly and superiorly within the left upper lobe (series 15, image 27) measures approximately 1.0 cm x 1.2 cm.Several enlarged mediastinal and hilar lymph nodes are demonstrated. Among these enlarged lymph nodes is a subcarinal lymph node measuring approximately 3.0 cm in transverse diameter. A right hilar lymph node measures approximately 1.4 cm transverse diameter. A right paratracheal lymph node measures approximately 1.4 cm x 1.1 cm. No effusion     MRI Brain negative for intracranial pathology     in ED fever 101, RSV positive received 30cc/kg IVF bolus, received 2 units PRBC for hb 5.8     pshx catarct surgery 10 yrs ago  social hx lives with daughter nonsmoker, no etoh or recreational drug use   family hx mother - diabtes mellitus  siblings asthma   no family hx of stroke or cancer   (2022 10:38)      PAST MEDICAL & SURGICAL HISTORY:  DM (diabetes mellitus)          Allergies    penicillins (Unknown)    Intolerances        MEDICATIONS  (STANDING):  atenolol  Tablet 25 milliGRAM(s) Oral daily  dextrose 5%. 1000 milliLiter(s) (100 mL/Hr) IV Continuous <Continuous>  dextrose 5%. 1000 milliLiter(s) (50 mL/Hr) IV Continuous <Continuous>  dextrose 50% Injectable 25 Gram(s) IV Push once  dextrose 50% Injectable 12.5 Gram(s) IV Push once  dextrose 50% Injectable 25 Gram(s) IV Push once  ferrous    sulfate 325 milliGRAM(s) Oral daily  glucagon  Injectable 1 milliGRAM(s) IntraMuscular once  insulin glargine Injectable (LANTUS) 5 Unit(s) SubCutaneous at bedtime  insulin lispro (ADMELOG) corrective regimen sliding scale   SubCutaneous three times a day before meals  insulin lispro (ADMELOG) corrective regimen sliding scale   SubCutaneous at bedtime  sodium chloride 0.9%. 1000 milliLiter(s) (50 mL/Hr) IV Continuous <Continuous>    MEDICATIONS  (PRN):  acetaminophen     Tablet .. 650 milliGRAM(s) Oral every 6 hours PRN Temp greater or equal to 38C (100.4F), Mild Pain (1 - 3)  aluminum hydroxide/magnesium hydroxide/simethicone Suspension 30 milliLiter(s) Oral every 4 hours PRN Dyspepsia  dextrose Oral Gel 15 Gram(s) Oral once PRN Blood Glucose LESS THAN 70 milliGRAM(s)/deciliter  melatonin 3 milliGRAM(s) Oral at bedtime PRN Insomnia  ondansetron Injectable 4 milliGRAM(s) IV Push every 8 hours PRN Nausea and/or Vomiting      FAMILY HISTORY:      SOCIAL HISTORY: No EtOH, no tobacco    REVIEW OF SYSTEMS:    CONSTITUTIONAL: No weakness, fevers or chills  EYES/ENT: No visual changes;  No vertigo or throat pain   NECK: No pain or stiffness  RESPIRATORY: No cough, wheezing, hemoptysis; No shortness of breath  CARDIOVASCULAR: No chest pain or palpitations  GASTROINTESTINAL: No abdominal or epigastric pain. No nausea, vomiting, or hematemesis; No diarrhea or constipation. No melena or hematochezia.  GENITOURINARY: No dysuria, frequency or hematuria  NEUROLOGICAL: No numbness or weakness  SKIN: No itching, burning, rashes, or lesions   All other review of systems is negative unless indicated above.        T(F): 98.5 (22 @ 22:02), Max: 102.7 (22 @ 18:00)  HR: 89 (22 @ 22:02)  BP: 106/42 (22 @ 22:02)  RR: 18 (22 @ 22:02)  SpO2: 99% (22 @ 22:02)  Wt(kg): --    GENERAL: NAD, well-developed  HEAD:  Atraumatic, Normocephalic  EYES: EOMI, PERRLA, conjunctiva and sclera clear  NECK: Supple, No JVD  CHEST/LUNG: Clear to auscultation bilaterally; No wheeze  HEART: Regular rate and rhythm; No murmurs, rubs, or gallops  ABDOMEN: Soft, Nontender, Nondistended; Bowel sounds present  EXTREMITIES:  2+ Peripheral Pulses, No clubbing, cyanosis, or edema  NEUROLOGY: non-focal  SKIN: No rashes or lesions                          10.0   10.16 )-----------( 220      ( 2022 10:07 )             30.6           135  |  107  |  23  ----------------------------<  235<H>  4.7   |  25  |  0.73    Ca    9.2      2022 10:07    TPro  7.7  /  Alb  1.3<L>  /  TBili  1.0  /  DBili  x   /  AST  38<H>  /  ALT  14  /  AlkPhos  250<H>            PT/INR - ( 2022 10:07 )   PT: 20.3 sec;   INR: 1.74 ratio         PTT - ( 2022 10:07 )  PTT:32.6 sec    .Blood None   @ 23:50   No growth to date.  --  --

## 2022-11-14 NOTE — PHYSICAL THERAPY INITIAL EVALUATION ADULT - LEVEL OF INDEPENDENCE: SUPINE/SIT, REHAB EVAL
Pt having difficulty assisting therapist with mobility. Pt having difficulty following commands verbal and tactile./moderate assist (50% patients effort)/maximum assist (25% patients effort)

## 2022-11-14 NOTE — DIETITIAN INITIAL EVALUATION ADULT - OTHER INFO
78 y/o F with pmhx IDDM , HTN, anemia , liver and splenic mass with lung and intrabdominal lymphadenopathy , chronic left upper extremity paresis/contracture seen by Dr ray neurosurgery sept 2021 and found to have spinal nerve compression at that time - spinal fusion recommended but patient an daughter declined surgery. Pt was brought in by daughter from home for fever 101 at home day PTA and 2 days of lethargy , decreased po intake, dark urine , for the past two days , has not been taking her meds, she spits out her meds.  Dr Lopez since march 2022 for anemia - , had MRI abd/chest in Oct which showed liver and splenic mass with lung and intra-abdominal lymphadenopathy , on 11/9/22 had PET scan outpt - plan was to follow up with Dr lopez last week for arranging tissue biopsy however missed appt. Also since Jan 2022 patient is under care of [facility] neurology for worsening mentation and abnormal gait, balance issues  and issues with speech as per daughter. She was supposed to have EEG last month which was missed , patient had MRI brain with IV contrast at [facility] outpt  11/9/22- result unknown  Admit for acute on chronic severe anemia,  hx liver and spleenmass and lung and abdominal lymphadenopathy MRI abd/chest done in Oct 2022 and fever with lactatemia - sepsis present on admission    Unable to obtain diet / wt hx. Pt seen by SLP on 11/13/22 and rec'd an easy to chew diet 2/2 progressively increasing altered mentation. RD spoke w/ nurse and pt consuming <50% of meals since admit; suggest meal encouragement; tray set-up and full assistance to optimize po intake. Unable to obtain bed scale wt 2/2 bed scale not working; admit wt of 119.9# doc'd on 11/12/22 - pt appears thinner, will use IBW to calculate needs. NFPE reveals severe muscle/ fat wasting - appears thin, frail, and estephanie. W/ diabetes, no hgb A1C and POCT WNL - Liberalize diet to regular to maximize caloric and nutrient intake, c/w easy to chew as per SLP. Add ensure + HP TID. See below for other recommendations.

## 2022-11-14 NOTE — DIETITIAN INITIAL EVALUATION ADULT - FACTORS AFF FOOD INTAKE
2/2 increased lethargy/change in mental status/difficulty feeding self/difficulty swallowing/persistent lack of appetite

## 2022-11-14 NOTE — DIETITIAN INITIAL EVALUATION ADULT - PERTINENT MEDS FT
MEDICATIONS  (STANDING):  atenolol  Tablet 25 milliGRAM(s) Oral daily  dextrose 5%. 1000 milliLiter(s) (100 mL/Hr) IV Continuous <Continuous>  dextrose 5%. 1000 milliLiter(s) (50 mL/Hr) IV Continuous <Continuous>  dextrose 50% Injectable 25 Gram(s) IV Push once  dextrose 50% Injectable 12.5 Gram(s) IV Push once  dextrose 50% Injectable 25 Gram(s) IV Push once  ferrous    sulfate 325 milliGRAM(s) Oral daily  glucagon  Injectable 1 milliGRAM(s) IntraMuscular once  insulin glargine Injectable (LANTUS) 5 Unit(s) SubCutaneous at bedtime  insulin lispro (ADMELOG) corrective regimen sliding scale   SubCutaneous three times a day before meals  insulin lispro (ADMELOG) corrective regimen sliding scale   SubCutaneous at bedtime  sodium chloride 0.9%. 1000 milliLiter(s) (50 mL/Hr) IV Continuous <Continuous>    MEDICATIONS  (PRN):  acetaminophen     Tablet .. 650 milliGRAM(s) Oral every 6 hours PRN Temp greater or equal to 38C (100.4F), Mild Pain (1 - 3)  aluminum hydroxide/magnesium hydroxide/simethicone Suspension 30 milliLiter(s) Oral every 4 hours PRN Dyspepsia  dextrose Oral Gel 15 Gram(s) Oral once PRN Blood Glucose LESS THAN 70 milliGRAM(s)/deciliter  melatonin 3 milliGRAM(s) Oral at bedtime PRN Insomnia  ondansetron Injectable 4 milliGRAM(s) IV Push every 8 hours PRN Nausea and/or Vomiting

## 2022-11-14 NOTE — CONSULT NOTE ADULT - ASSESSMENT
78 y/o F w/ PMHx DM, HTN, HLD, left hand weakness and pain 2/2 radiculopathy presents for evaluation of anemia found have direct harvey positive and anemia of inflammation now s/p 2u pRBCs given 9/2 with appropriate response now with CT c/a/p showing multiple liver and splenic lesions with LAD.    # lymphadenopathy with liver and splenic lesions  - imaging performed at [facility]   - 10/26 CT abd/pelvis with multiple masses in liver and spleen with the largest appearing to reside within segment 3, measuring approximately 4.5 cm in diameter. Multiple low­density lesions are demonstrated throughout the spleen, measuring up to approximately 2.6 cm in diameter. Portions of periportal lymphadenopathy reside adjacent to the pancreatic head, hampering its assessment. There is a mild degree of right­sided hydronephrosis and a mild to moderate degree of left­sided hydronephrosis, with the ureters resuming normal caliber at the level of the upper portions of the pelvis. Lymphadenopathy is demonstrated throughout the retroperitoneum, measuring up to at least 4.5 cm transverse diameter. In addition, periportal lymphadenopathy is demonstrated, measuring up to approximately 3.4 cm in diameter. No bone lesions  - 10/26­ CT chest ­ A focus of groundglass opacity posteriorly and superiorly within the left upper lobe (series 15, image 27) measures approximately 1.0 cm x 1.2 cm.Several enlarged mediastinal and hilar lymph nodes are demonstrated. Among these enlarged lymph nodes is a subcarinal lymph node measuring approximately 3.0 cm in transverse diameter. A right hilar lymph node measures approximately 1.4 cm transverse diameter. A right paratracheal lymph node measures approximately 1.4 cm x 1.1 cm. No effusion  - 11/11- MRI brain negative for malignancy  - Pt had PET CT performed as well on 11/10  - biopsy was arranged for pt outpatient with Dr. Rodrigues, however was not performed   - Recommend IR guided biopsy while inpatient for diagnosis  - pt with Hb 5.8, improved to 10 s/p 2u pRBCs      # Microcytic anemia   - pt has h/o alpha thalassemia, has not required blood transfusions outpatient   - ANEMIA OF CHRONIC INFLAMMATION­ iron studies revealed ferritin 838, tibc 132, iron sat 13%, iron 17, negative intrinsic factor, b12 >2000, folic acid >24  ­ POSITIVE alpha thalassemia 2/4 genes    - Blood work reviewed from 9/20 revealed WBC 11.5, Hb 9.1, mcv 71, plt 309, ,   - - pt with Hb 5.8, improved to 10 s/p 2u pRBCs - repeat today     # RSV  - ID following      76 y/o F w/ PMHx DM, HTN, HLD, left hand weakness and pain 2/2 radiculopathy presents for evaluation of anemia found have direct harvey positive and anemia of inflammation now s/p 2u pRBCs given 9/2 with appropriate response now with CT c/a/p showing multiple liver and splenic lesions with LAD.    # lymphadenopathy with liver and splenic lesions  - imaging performed at [facility]   - 10/26 CT abd/pelvis with multiple masses in liver and spleen with the largest appearing to reside within segment 3, measuring approximately 4.5 cm in diameter. Multiple low­density lesions are demonstrated throughout the spleen, measuring up to approximately 2.6 cm in diameter. Portions of periportal lymphadenopathy reside adjacent to the pancreatic head, hampering its assessment. There is a mild degree of right­sided hydronephrosis and a mild to moderate degree of left­sided hydronephrosis, with the ureters resuming normal caliber at the level of the upper portions of the pelvis. Lymphadenopathy is demonstrated throughout the retroperitoneum, measuring up to at least 4.5 cm transverse diameter. In addition, periportal lymphadenopathy is demonstrated, measuring up to approximately 3.4 cm in diameter. No bone lesions  - 10/26­ CT chest ­ A focus of groundglass opacity posteriorly and superiorly within the left upper lobe (series 15, image 27) measures approximately 1.0 cm x 1.2 cm.Several enlarged mediastinal and hilar lymph nodes are demonstrated. Among these enlarged lymph nodes is a subcarinal lymph node measuring approximately 3.0 cm in transverse diameter. A right hilar lymph node measures approximately 1.4 cm transverse diameter. A right paratracheal lymph node measures approximately 1.4 cm x 1.1 cm. No effusion  - 11/11- MRI brain negative for malignancy  - Pt had PET CT performed as well on 11/10  - flow cytometry negative for acute leukemia or NHL or high grade MDS  - biopsy was arranged for pt outpatient with Dr. Rodrigues, however was not performed   - Recommend IR guided biopsy while inpatient for diagnosis  - pt with Hb 5.8, improved to 10 s/p 2u pRBCs      # Microcytic anemia   - pt has h/o alpha thalassemia, has not required blood transfusions outpatient   - ANEMIA OF CHRONIC INFLAMMATION­ iron studies revealed ferritin 838, tibc 132, iron sat 13%, iron 17, negative intrinsic factor, b12 >2000, folic acid >24  ­ POSITIVE alpha thalassemia 2/4 genes    - Blood work reviewed from 9/20 revealed WBC 11.5, Hb 9.1, mcv 71, plt 309, ,   - - pt with Hb 5.8, improved to 10 s/p 2u pRBCs - repeat today     # RSV  - ID following

## 2022-11-14 NOTE — PHYSICAL THERAPY INITIAL EVALUATION ADULT - DIAGNOSIS, PT EVAL
Acute on chronic severe aenmia, fever, lactatemia. sepsis, acute ME. Hx as above. Acute on chronic severe aenmia, fever, lactatemia. sepsis, acute ME. Hx as above. +RSV

## 2022-11-14 NOTE — DIETITIAN INITIAL EVALUATION ADULT - HEIGHT FOR BMI (CENTIMETERS)
Group: [facility]         CONSULT NOTE    Patient Identification:    Adarsh Davison  55 y.o.  male  1965  [id number]            Patient Care Team:  Harrison Chin MD as PCP - General (Family Medicine)    Requesting physician: Dr. Fair    Reason for Consultation: Unexplained persistent dyspnea on exertion, abnormal CAT scan    CC: Shortness of breath    History of Present Illness: Patient is a 55-year-old morbidly obese white male with a past medical history significant for provoked DVT/PE recently after right shoulder procedure in June 20 and on anticoagulation, morbid obesity, congestive heart failure on diuretics, hypothyroidism on testosterone supplementation who was admitted to the hospital with persistent dyspnea on exertion going on for the last couple months.  Patient states that he has been struggling with this for the last several weeks and is unable to get answers and was told that he has sleep apnea and needs treatment for it but given persistent symptoms he decided to come to the ER.  He felt that his symptoms got worse after EGD yesterday.  He complains of some orthopnea symptoms denies any significant pedal edema.  No significant cough or sputum production no fevers or chills.  No wheezing.  No prior diagnosis of COPD or asthma.  No significant occupational exposures.  He gets most of his care at [facility] and have reviewed the records over there.    He underwent comprehensive evaluation for his lung nodules done previously including a PET scan/navigational bronchoscopy and follows with lung nodule clinic over there.  I have reviewed the CT of the chest done in the ER during this hospitalization but unable to compare with the images previously at [facility] due to lack of access to images.    I have reviewed the H&P as well as the notes from the other consultants taking care of the patient this admission as well as previous hospital notes (if any available) from our group  "physicians and summarized above      Objective     Review of Systems:  Constitutional: No fever, chills, weight loss or loss of appetite.   ENMT: No sinus congestion, post nasal drip, epistaxis, sore throat  Cardiovascular: No chest pain, palpitation or legs swelling.    Respiratory: No hemoptysis, orthopnea, PND.  Gastrointestinal: No constipation, diarrhea or abdominal pain   Neurology: No headache, focal weakness, numbness or dizziness.   Musculoskeletal: No joint stiffness or swelling.   Psychiatry: No agitation or behavioral changes  Lymphatic: No swollen neck glands.  Integumentary: No rash.    Past Medical History:  Past Medical History:   Diagnosis Date   • Arthritis    • Carpal tunnel syndrome of right wrist    • Disease of thyroid gland    • Edema    • Hypertension    • Hypothyroidism    • Labral tear of shoulder    • Neuropathy    • Pulmonary embolism (CMS/HCC)    • Type 2 diabetes mellitus (CMS/HCC)    • Vitamin D deficiency        Past Surgical History:  Past Surgical History:   Procedure Laterality Date   • SHOULDER ARTHROSCOPY W/ ROTATOR CUFF REPAIR Left 10/19/2016    Procedure: LEFT SHOULDER ARTHROSCOPY WITH ROTATOR CUFF REPAIR WITH ACROMIOPLASTY, DISTAL CLAVICLE EXCISION, LABRAL DEBRIDEMENT;  Surgeon: David Allison MD;  Location: [facility] OR [facility] [place], [place];  Service:    • TONSILLECTOMY     • UVULECTOMY          Home Meds:  Medications Prior to Admission   Medication Sig Dispense Refill Last Dose   • BD HYPODERMIC NEEDLE 18G X 1\" misc USE AS DIRECTED TO WITHDRAW TESTOSTERONE 3 each 4 Taking   • carvedilol (COREG) 25 MG tablet TAKE ONE TABLET BY MOUTH DAILY 30 tablet 5 9/2/2020 at Unknown time   • cloNIDine (CATAPRES) 0.1 MG tablet TAKE ONE TABLET BY MOUTH TWICE A DAY 60 tablet 0 9/2/2020 at Unknown time   • Continuous Blood Gluc  (FREESTYLE SARBJIT 14 DAY READER) device USE ONE APPLICATION FOR 1 DOSE 1 Device 0    • Continuous Blood Gluc Sensor (FREESTYLE SARBJIT 14 DAY SENSOR) misc 1 application Every 14 " "(Fourteen) Days. 2 each 5    • Cyanocobalamin 1000 MCG/15ML liquid Take 5,000 mcg by mouth Daily.      • cyclobenzaprine (FLEXERIL) 10 MG tablet Take 1 tablet by mouth 3 (Three) Times a Day As Needed for Muscle Spasms. 12 tablet 0 Taking   • famotidine (PEPCID) 20 MG tablet Take 20 mg by mouth 2 (Two) Times a Day.      • furosemide (LASIX) 40 MG tablet Take 40 mg by mouth daily.   9/2/2020 at Unknown time   • JARDIANCE 25 MG tablet TAKE ONE TABLET BY MOUTH DAILY 30 tablet 0 9/2/2020 at Unknown time   • levothyroxine (SYNTHROID, LEVOTHROID) 175 MCG tablet TAKE ONE TABLET BY MOUTH DAILY 30 tablet 5 9/2/2020 at Unknown time   • lisinopril (PRINIVIL,ZESTRIL) 40 MG tablet TAKE ONE TABLET BY MOUTH DAILY 30 tablet 4 9/2/2020 at Unknown time   • pregabalin (Lyrica) 75 MG capsule Take 1 capsule by mouth 2 (Two) Times a Day. 60 capsule 2    • rivaroxaban (XARELTO) 10 MG tablet Take 20 mg by mouth Daily.      • rosuvastatin (CRESTOR) 40 MG tablet Take 1 tablet by mouth Every Night. 90 tablet 1 9/2/2020 at Unknown time   • Syringe/Needle, Disp, (B-D 3CC LUER-BETTY SYR 23GX1\") 23G X 1\" 3 ML misc USE AS DIRECTED FOR TESTOSTERONE INJECTION 4 each 0 Taking   • traZODone (DESYREL) 50 MG tablet Take 50 mg by mouth Every Night.      • TRULICITY 1.5 MG/0.5ML solution pen-injector INJECT 1.5 MG UNDER THE SKIN ONCE WEEKLY 4 pen 0 Past Month at Unknown time   • ibuprofen (ADVIL,MOTRIN) 600 MG tablet Take 1 tablet by mouth Every 6 (Six) Hours As Needed for Mild Pain  or Moderate Pain  (take with food). 24 tablet 0 Taking   • Testosterone Cypionate (DEPOTESTOTERONE CYPIONATE) 200 MG/ML injection INJECT 1 ML INTRAMUSCULARLY EVERY 8 DAYS 4 mL 0 More than a month at Unknown time   • vitamin D (ERGOCALCIFEROL) 1.25 MG (73190 UT) capsule capsule TAKE ONE CAPSULE BY MOUTH TWICE WEEKLY 8 capsule 4 More than a month at Unknown time       Allergies:  Allergies   Allergen Reactions   • Cialis [Tadalafil] Other (See Comments)     BODY ACHES   • " "Metformin Confusion   • Lortab [Hydrocodone-Acetaminophen] Rash   • Lotrel [Amlodipine Besy-Benazepril Hcl] Rash       Social History:   Social History     Socioeconomic History   • Marital status:      Spouse name: Not on file   • Number of children: Not on file   • Years of education: Not on file   • Highest education level: Not on file   Tobacco Use   • Smoking status: Never Smoker   • Smokeless tobacco: Never Used   Substance and Sexual Activity   • Alcohol use: No   • Drug use: No   • Sexual activity: Defer       Family History:  Family History   Problem Relation Age of Onset   • Stroke Mother        Physical Exam:  /82 (BP Location: Right arm, Patient Position: Lying)   Pulse 65   Temp 97.9 °F (36.6 °C) (Oral)   Resp 18   Ht 182.9 cm (72\")   Wt 131 kg (288 lb 12.8 oz)   SpO2 93%   BMI 39.17 kg/m²  Body mass index is 39.17 kg/m². 93% 131 kg (288 lb 12.8 oz)    Physical Exam     Constitutional: Middle aged pt in bed, No acute respiratory distress, + accessory muscle use -appears uncomfortable and is trying to clear his throat  Head: - NCAT  Eyes: No pallor, Anicteric conjunctiva, EOMI.  ENMT:  Mallampati 4, no oral thrush. Moist MM.   Occasional stridor  NECK: Trachea midline, No thyromegaly, no palpable cervical LNpathy  Heart: RRR, no murmur. No pedal edema   Lungs: LESLEE +, No wheezes/ crackles heard    Abdomen: Soft. No tenderness, guarding or rigidity. No palpable masses  Extremities: Extremities warm and well perfused. No cyanosis/ clubbing  Neuro: Conscious, answers appropriately, no gross focal neuro deficits  Psych: Mood and affect appropriate    LABS:  Lab Results   Component Value Date    CALCIUM 8.8 09/02/2020     Results from last 7 days   Lab Units 09/02/20  0624 09/01/20  1913  08/31/20  0337 08/29/20  1623   SODIUM mmol/L 137 134*  --  137 136*   POTASSIUM mmol/L 3.8 3.7  --  3.8 4.2   CHLORIDE mmol/L 102 99  --  103 103   TOTAL CO2 mmol/L  --   --   --  27 26   CO2 mmol/L 26.7 " 26.1  --   --   --    BUN mg/dL 10 10  --  8 9   CREATININE mg/dL 0.90 1.10  --  0.6* 0.6*   GLUCOSE mg/dL 109* 113*   < >  --   --    CALCIUM mg/dL 8.8 9.7  --  8.7 8.9   WBC 10*3/mm3 8.86 9.60  --  11.10* 8.60   HEMOGLOBIN g/dL 14.8 15.5  --  16.6 15.5   PLATELETS 10*3/mm3 219 225  --  239 215   ALT (SGPT) U/L  --  34  --  41 43   AST (SGOT) U/L  --  23  --  33 36   PROBNP pg/mL  --  92.0  --   --   --     < > = values in this interval not displayed.     Lab Results   Component Value Date    TROPONINI <0.012 08/27/2020    TROPONINT <0.010 09/02/2020             Results from last 7 days   Lab Units 09/01/20  1913 08/27/20  0239   INR  1.25* 1.1                 Imaging: I personally visualized the images of chest scans/ x-rays performed within last 3 days and summarized below.    Assessment   Persistent shortness of breath  RML lung nodule s/p prior bronch/ PET eval and felt neg @ [facility]   Right hilar/mediastinal lymphadenopathy -stable since [facility] CT  Recent provoked PE on anticoagulation  Suspected undiagnosed sleep apnea  Dysphagia with recent EGD requiring dilation    RECOMMENDATIONS:  Patient is being asked to be evaluated for unexplained dyspnea on exertion.  His work-up so far seems to be negative.      Review of [facility] records showed echocardiogram which is within normal limits, CT of the chest which does not have any good reason for his persistent shortness of breath.  Exam shows some stridor.  He did have some concerning changes of thyroiditis and a PET scan previously.  Will consider CT of the neck to better evaluate and might need direct laryngoscopy as well eventually.  Would continue bronchodilators PRN for now    Regarding the lung nodule-he underwent a CT of the chest again and this seems to be his fourth or fifth CAT scan in the last 2 to 3 years and he has had several lung nodules previously and even underwent a navigational bronchoscopy in 2018 for right middle lobe lung nodule which was  thought to be benign.  He is getting followed up for these nodules at [facility] along with Dr. Munoz and his primary care and hence would recommend to continue with follow-up accordingly.    Will need outpatient sleep clinic evaluation and consideration for sleep apnea treatment if diagnosed.    Continue with anticoagulation for DVT/PE. Would not stop while on testosterone supplementation given high risk of recurrence.    Complains of having recurrent dysphagia now and EGD report from [facility] - 2 days back reviewed which is negative but there was some concern for mild esophageal tear but there is no evidence of new mediastinum and CT done prior to admission. Defer to primary. SLP eval noted     Overall should be able to be discharged and continue to follow-up as an outpatient at [facility] or with us if interested if CT of the neck is negative.    Thank you for letting me participate in the care of this pleasant patient.  I have discussed my findings and recommendations with patient.     Fuad Garcia MD  9/2/2020  16:48     157.48

## 2022-11-14 NOTE — PHYSICAL THERAPY INITIAL EVALUATION ADULT - MANUAL MUSCLE TESTING RESULTS, REHAB EVAL
Right UE 4/5 throughout grossly, left UE not fully assessed. Bilateral LE strength 4/5 throughout grossly.

## 2022-11-14 NOTE — DIETITIAN INITIAL EVALUATION ADULT - ORAL INTAKE PTA/DIET HISTORY
Unable to obtain information 2/2 confused, poor historian. RD spoke w/ nurse; pt lives at home w/ daughter, but currently working w/ social work to obtain aide or place in nursing home. As per nurse, daughter reports pt has not been eating well for "awhile" 2/2 increased lethargy

## 2022-11-14 NOTE — PHARMACOTHERAPY INTERVENTION NOTE - COMMENTS
Medication history complete, reviewed medication with patients daughter Johnson at [phone] and confirmed with [facility].

## 2022-11-14 NOTE — PROGRESS NOTE ADULT - ASSESSMENT
# Acute on chronic severe anemia   # hx liver and spleenmass and lung and abdominal lymphadenopathy MRI abd/chest done in Oct 2022  # Coagulopathy , not on blood thinners   no evidence of gross bleed  s/p 2 units PRBC in ED, now Hb 10  FOBT  consult with miri  will dw with Dr chery re results of MRI with con brain and PET scan done outpt 11/9/22    # fever with lactatemia - sepsis present on admission  suspect from RSV URI   blood cx , ucx  gm positive  start vanco 750 bid i freida andino  CXR no PNA, UA neg, no abd tenderness, has had multiple scans recently as outpatient will obtain results from Dr chery   ID consult     #  Acute metabolic encephalopathy /suspect underlying dementia  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 dysphagia    sees [facility] neurology  MRI brain IV con done 11/9 as outpatient -  swallow eval    #Hx HTN, DM , anemia on iron pills   continue home meds   held lisinopril   novolin changed to lantus inpatient,     # scd for vte prophylaxis   INR 2    pt is full code- advanced care directive time spent 20mins   daughter agreeable to feeding tube if need arises    i dw with daughter mark dietzpps [phone]

## 2022-11-14 NOTE — PHYSICAL THERAPY INITIAL EVALUATION ADULT - PERTINENT HX OF CURRENT PROBLEM, REHAB EVAL
Pt admitted to  secondary to weakness and anemia. Pt with a hx of left UE paresis/contracture. Dr. Zavala recommended sx. Pt being seen by Dr. Lopez MRI abd/chest showed liver, spleen, and lung mass with intra-abdominal lymphadenopathy. Pt also seeing nuerology for AMS and abnormal gait. H/H- 8.6/26.0, BG-130.

## 2022-11-14 NOTE — PHYSICAL THERAPY INITIAL EVALUATION ADULT - GENERAL OBSERVATIONS, REHAB EVAL
Pt found supine in bed with bed alarm activated. Pt noted to have left UE deformity/contracture. PAINAD- 0/10. Pt having difficulty answering therapist's questions and difficulty following commands.

## 2022-11-14 NOTE — PHYSICAL THERAPY INITIAL EVALUATION ADULT - PLANNED THERAPY INTERVENTIONS, PT EVAL
Increase mobility as tolerated. Eval, bed mobility. Pt left in bed with all observation section equipment/material intact and bed alarm activated. PAINAD- 0/10. Will cont to follow pt and increase as tolerated./bed mobility training/gait training/ROM/strengthening/transfer training

## 2022-11-14 NOTE — DIETITIAN INITIAL EVALUATION ADULT - ETIOLOGY
r/t inability to meet increased needs 2/2 acute on chronic anemia, sepsis, & hx liver and spleenmass and lung and abdominal lymphadenopathy

## 2022-11-14 NOTE — DIETITIAN NUTRITION RISK NOTIFICATION - ADDITIONAL COMMENTS/DIETITIAN RECOMMENDATIONS
1) Liberalize diet to regular to maximize caloric and nutrient intake, continue w/ easy to chew as per SLP and as tolerated  2) Add ensure + HP shake TID  3) Monitor bowel movements, if no BM for >3 days, consider implementing bowel regimen.  4) MVI w/ minerals daily to ensure 100% RDA met  5) Consider adding thiamine 200 mg daily 2/2 poor PO intake/ malnutrition  6) Encourage protein-rich foods, maximize food preferences  7) Meal encouragement; tray set-up to optimize po intake  8) Confirm goals of care regarding nutrition support****  RD will continue to monitor PO intake, labs, hydration, and wt prn.

## 2022-11-14 NOTE — DIETITIAN NUTRITION RISK NOTIFICATION - TREATMENT: THE FOLLOWING DIET HAS BEEN RECOMMENDED
Diet, Easy to Chew:   Consistent Carbohydrate {No Snacks} (CSTCHO) (11-13-22 @ 17:34) [Active]

## 2022-11-15 LAB
-  AMPICILLIN: SIGNIFICANT CHANGE UP
-  CIPROFLOXACIN: SIGNIFICANT CHANGE UP
-  LEVOFLOXACIN: SIGNIFICANT CHANGE UP
-  NITROFURANTOIN: SIGNIFICANT CHANGE UP
-  TETRACYCLINE: SIGNIFICANT CHANGE UP
-  VANCOMYCIN: SIGNIFICANT CHANGE UP
ALBUMIN SERPL ELPH-MCNC: 1.2 G/DL — LOW (ref 3.3–5)
ALP SERPL-CCNC: 294 U/L — HIGH (ref 40–120)
ALT FLD-CCNC: 16 U/L — SIGNIFICANT CHANGE UP (ref 12–78)
ANION GAP SERPL CALC-SCNC: 5 MMOL/L — SIGNIFICANT CHANGE UP (ref 5–17)
AST SERPL-CCNC: 36 U/L — SIGNIFICANT CHANGE UP (ref 15–37)
BASOPHILS # BLD AUTO: 0.01 K/UL — SIGNIFICANT CHANGE UP (ref 0–0.2)
BASOPHILS NFR BLD AUTO: 0.1 % — SIGNIFICANT CHANGE UP (ref 0–2)
BILIRUB SERPL-MCNC: 0.7 MG/DL — SIGNIFICANT CHANGE UP (ref 0.2–1.2)
BUN SERPL-MCNC: 17 MG/DL — SIGNIFICANT CHANGE UP (ref 7–23)
CALCIUM SERPL-MCNC: 9.4 MG/DL — SIGNIFICANT CHANGE UP (ref 8.5–10.1)
CHLORIDE SERPL-SCNC: 108 MMOL/L — SIGNIFICANT CHANGE UP (ref 96–108)
CO2 SERPL-SCNC: 28 MMOL/L — SIGNIFICANT CHANGE UP (ref 22–31)
CREAT SERPL-MCNC: 0.6 MG/DL — SIGNIFICANT CHANGE UP (ref 0.5–1.3)
CRP SERPL-MCNC: 218 MG/L — HIGH
CULTURE RESULTS: SIGNIFICANT CHANGE UP
EGFR: 92 ML/MIN/1.73M2 — SIGNIFICANT CHANGE UP
EOSINOPHIL # BLD AUTO: 0.01 K/UL — SIGNIFICANT CHANGE UP (ref 0–0.5)
EOSINOPHIL NFR BLD AUTO: 0.1 % — SIGNIFICANT CHANGE UP (ref 0–6)
ERYTHROCYTE [SEDIMENTATION RATE] IN BLOOD: 106 MM/HR — HIGH (ref 0–20)
FERRITIN SERPL-MCNC: HIGH NG/ML (ref 15–150)
GLUCOSE SERPL-MCNC: 137 MG/DL — HIGH (ref 70–99)
HCT VFR BLD CALC: 26.3 % — LOW (ref 34.5–45)
HGB BLD-MCNC: 8.9 G/DL — LOW (ref 11.5–15.5)
IMM GRANULOCYTES NFR BLD AUTO: 2.8 % — HIGH (ref 0–0.9)
IRON SATN MFR SERPL: 16 UG/DL — LOW (ref 30–160)
IRON SATN MFR SERPL: 20 % — SIGNIFICANT CHANGE UP (ref 14–50)
LDH SERPL L TO P-CCNC: 211 U/L — SIGNIFICANT CHANGE UP (ref 84–241)
LYMPHOCYTES # BLD AUTO: 0.83 K/UL — LOW (ref 1–3.3)
LYMPHOCYTES # BLD AUTO: 9.2 % — LOW (ref 13–44)
MCHC RBC-ENTMCNC: 23 PG — LOW (ref 27–34)
MCHC RBC-ENTMCNC: 33.8 GM/DL — SIGNIFICANT CHANGE UP (ref 32–36)
MCV RBC AUTO: 68 FL — LOW (ref 80–100)
METHOD TYPE: SIGNIFICANT CHANGE UP
MONOCYTES # BLD AUTO: 1.24 K/UL — HIGH (ref 0–0.9)
MONOCYTES NFR BLD AUTO: 13.7 % — SIGNIFICANT CHANGE UP (ref 2–14)
NEUTROPHILS # BLD AUTO: 6.72 K/UL — SIGNIFICANT CHANGE UP (ref 1.8–7.4)
NEUTROPHILS NFR BLD AUTO: 74.1 % — SIGNIFICANT CHANGE UP (ref 43–77)
ORGANISM # SPEC MICROSCOPIC CNT: SIGNIFICANT CHANGE UP
ORGANISM # SPEC MICROSCOPIC CNT: SIGNIFICANT CHANGE UP
PLATELET # BLD AUTO: 226 K/UL — SIGNIFICANT CHANGE UP (ref 150–400)
POTASSIUM SERPL-MCNC: 3.3 MMOL/L — LOW (ref 3.5–5.3)
POTASSIUM SERPL-SCNC: 3.3 MMOL/L — LOW (ref 3.5–5.3)
PROT SERPL-MCNC: 6.7 GM/DL — SIGNIFICANT CHANGE UP (ref 6–8.3)
RBC # BLD: 3.87 M/UL — SIGNIFICANT CHANGE UP (ref 3.8–5.2)
RBC # FLD: 30.5 % — HIGH (ref 10.3–14.5)
SODIUM SERPL-SCNC: 141 MMOL/L — SIGNIFICANT CHANGE UP (ref 135–145)
SPECIMEN SOURCE: SIGNIFICANT CHANGE UP
TIBC SERPL-MCNC: 78 UG/DL — LOW (ref 220–430)
UIBC SERPL-MCNC: 62 UG/DL — LOW (ref 110–370)
URATE SERPL-MCNC: 2.7 MG/DL — SIGNIFICANT CHANGE UP (ref 2.5–7)
WBC # BLD: 9.06 K/UL — SIGNIFICANT CHANGE UP (ref 3.8–10.5)
WBC # FLD AUTO: 9.06 K/UL — SIGNIFICANT CHANGE UP (ref 3.8–10.5)

## 2022-11-15 PROCEDURE — 99232 SBSQ HOSP IP/OBS MODERATE 35: CPT

## 2022-11-15 RX ORDER — DEXTROSE MONOHYDRATE, SODIUM CHLORIDE, AND POTASSIUM CHLORIDE 50; .745; 4.5 G/1000ML; G/1000ML; G/1000ML
1000 INJECTION, SOLUTION INTRAVENOUS
Refills: 0 | Status: DISCONTINUED | OUTPATIENT
Start: 2022-11-15 | End: 2022-11-18

## 2022-11-15 RX ORDER — POTASSIUM CHLORIDE 20 MEQ
40 PACKET (EA) ORAL ONCE
Refills: 0 | Status: COMPLETED | OUTPATIENT
Start: 2022-11-15 | End: 2022-11-16

## 2022-11-15 RX ORDER — ACETAMINOPHEN 500 MG
650 TABLET ORAL EVERY 6 HOURS
Refills: 0 | Status: DISCONTINUED | OUTPATIENT
Start: 2022-11-15 | End: 2022-11-23

## 2022-11-15 RX ORDER — ENOXAPARIN SODIUM 100 MG/ML
40 INJECTION SUBCUTANEOUS EVERY 24 HOURS
Refills: 0 | Status: DISCONTINUED | OUTPATIENT
Start: 2022-11-15 | End: 2022-11-15

## 2022-11-15 RX ORDER — SODIUM CHLORIDE 9 MG/ML
1000 INJECTION INTRAMUSCULAR; INTRAVENOUS; SUBCUTANEOUS
Refills: 0 | Status: DISCONTINUED | OUTPATIENT
Start: 2022-11-15 | End: 2022-11-15

## 2022-11-15 RX ORDER — POTASSIUM CHLORIDE 20 MEQ
40 PACKET (EA) ORAL ONCE
Refills: 0 | Status: DISCONTINUED | OUTPATIENT
Start: 2022-11-15 | End: 2022-11-15

## 2022-11-15 RX ADMIN — Medication 1: at 07:51

## 2022-11-15 RX ADMIN — SODIUM CHLORIDE 50 MILLILITER(S): 9 INJECTION INTRAMUSCULAR; INTRAVENOUS; SUBCUTANEOUS at 12:40

## 2022-11-15 RX ADMIN — Medication 2: at 11:34

## 2022-11-15 RX ADMIN — Medication 250 MILLIGRAM(S): at 21:21

## 2022-11-15 RX ADMIN — Medication 325 MILLIGRAM(S): at 08:55

## 2022-11-15 RX ADMIN — Medication 250 MILLIGRAM(S): at 08:55

## 2022-11-15 RX ADMIN — INSULIN GLARGINE 5 UNIT(S): 100 INJECTION, SOLUTION SUBCUTANEOUS at 21:22

## 2022-11-15 RX ADMIN — Medication 1: at 16:17

## 2022-11-15 RX ADMIN — DEXTROSE MONOHYDRATE, SODIUM CHLORIDE, AND POTASSIUM CHLORIDE 60 MILLILITER(S): 50; .745; 4.5 INJECTION, SOLUTION INTRAVENOUS at 18:35

## 2022-11-15 NOTE — PROGRESS NOTE ADULT - ASSESSMENT
# Acute on chronic severe anemia   # hx liver and spleenmass and lung and abdominal lymphadenopathy MRI abd/chest done in Oct 2022  # Coagulopathy , not on blood thinners   no evidence of gross bleed  s/p 2 units PRBC in ED, hb stable   FOBT  plan for IR biopsy - family wants to go ahead with bx   PET scan neg  flow cytometry neg   MRI brain with IC con no masses    # fever with lactatemia - sepsis and dehydration  present on admission  suspect from RSV URI  and UTI   still with high grade fevers partly this could also be attributed to underlying malignancy   blood cx , ucx  gm positive  start vanco 750 bid i dw dr andino  CXR no PNA, UA neg, no abd tenderness, has had multiple scans recently as outpatient will obtain results from Dr chery  ID consult     #  Acute metabolic encephalopathy /suspect underlying dementia  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 dysphagia sees [facility] neurology  MRI brain IV con done 11/9 as outpatient -no masses as per heme  swallow eval- easy to chew    # Failure to thrive  declines po intake   palliative consult for GOC re PEG     #Hx HTN, DM , anemia on iron pills   continue home meds   held lisinopril   novolin changed to lantus inpatient,     # scd for vte prophylaxis   INR 2    pt is full code- advanced care directive time spent 20mins   daughter agreeable to feeding tube if need arises    i dw with daughter mark dietzpps [phone]   # Acute on chronic severe anemia   # hx liver and spleenmass and lung and abdominal lymphadenopathy MRI abd/chest done in Oct 2022  # Coagulopathy , not on blood thinners   no evidence of gross bleed  s/p 2 units PRBC in ED, hb stable   FOBT  plan for IR biopsy - family wants to go ahead with bx   PET scan neg  flow cytometry neg   MRI brain with IC con no masses    # fever with lactatemia - sepsis and dehydration  present on admission  suspect from RSV URI  and UTI   still with high grade fevers partly this could also be attributed to underlying malignancy as well  blood cx , ucx  gm positive  start vanco 750 bid i dw dr andino  CXR no PNA, UA neg, no abd tenderness, has had multiple scans recently as outpatient will obtain results from Dr chery  ID consult     #  Acute metabolic encephalopathy /suspect underlying dementia  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 worsening dysphagia over months    sees [facility] neurology as outpatient   MRI brain IV con done 11/9 as outpatient -no masses as per heme  swallow eval- easy to chew    # Failure to thrive  declines po intake   palliative consult for GOC re PEG     #Hx HTN, DM , anemia on iron pills   continue home meds   held lisinopril   novolin changed to lantus inpatient,     # scd for vte prophylaxis   INR 2    pt is full code- advanced care directive time spent 20mins   daughter agreeable to feeding tube if need arises    i dw with daughter mark dietzpps [phone]

## 2022-11-15 NOTE — PROGRESS NOTE ADULT - ASSESSMENT
76 y/o F w/ PMHx DM, HTN, HLD, left hand weakness and pain 2/2 radiculopathy presents for evaluation of anemia found have direct harvey positive and anemia of inflammation now s/p 2u pRBCs given 9/2 with appropriate response now with CT c/a/p showing multiple liver and splenic lesions with LAD.    # lymphadenopathy with liver and splenic lesions  - imaging performed at [facility]   - 10/26 CT abd/pelvis with multiple masses in liver and spleen with the largest appearing to reside within segment 3, measuring approximately 4.5 cm in diameter. Multiple low­density lesions are demonstrated throughout the spleen, measuring up to approximately 2.6 cm in diameter. Portions of periportal lymphadenopathy reside adjacent to the pancreatic head, hampering its assessment. There is a mild degree of right­sided hydronephrosis and a mild to moderate degree of left­sided hydronephrosis, with the ureters resuming normal caliber at the level of the upper portions of the pelvis. Lymphadenopathy is demonstrated throughout the retroperitoneum, measuring up to at least 4.5 cm transverse diameter. In addition, periportal lymphadenopathy is demonstrated, measuring up to approximately 3.4 cm in diameter. No bone lesions  - 10/26­ CT chest ­ A focus of groundglass opacity posteriorly and superiorly within the left upper lobe (series 15, image 27) measures approximately 1.0 cm x 1.2 cm.Several enlarged mediastinal and hilar lymph nodes are demonstrated. Among these enlarged lymph nodes is a subcarinal lymph node measuring approximately 3.0 cm in transverse diameter. A right hilar lymph node measures approximately 1.4 cm transverse diameter. A right paratracheal lymph node measures approximately 1.4 cm x 1.1 cm. No effusion  - 11/11- MRI brain negative for malignancy  - Pt had PET CT performed as well on 11/10  - flow cytometry negative for acute leukemia or NHL or high grade MDS  - biopsy was arranged for pt outpatient with Dr. Rodrigues, however was not performed   - Recommend IR guided biopsy while inpatient for diagnosis- would prefer to be done in house rather than scheduling outpatient given current condition and recurrent fevers- tmax 102.8   - pt with Hb 5.8,  s/p 2u pRBCs    # Microcytic anemia   - pt has h/o alpha thalassemia, has not required blood transfusions outpatient   - ANEMIA OF CHRONIC INFLAMMATION­ iron studies revealed ferritin 838, tibc 132, iron sat 13%, iron 17, negative intrinsic factor, b12 >2000, folic acid >24  ­ POSITIVE alpha thalassemia 2/4 genes    - Blood work reviewed from 9/20 revealed WBC 11.5, Hb 9.1, mcv 71, plt 309, ,   - - pt with Hb 5.8, s/p 2u pRBCs   - trend cbc daily, send esr, crp, ldh, uric acid    # RSV  - ID following - pt on vancomycin   - tmax 102.8 overnight     dvt ppx     Dr. Phani Lopez  cell: [phone]  Weekends and nights please call [phone] for MD on call  [facility]  Hematology/Oncology

## 2022-11-16 DIAGNOSIS — R16.0 HEPATOMEGALY, NOT ELSEWHERE CLASSIFIED: ICD-10-CM

## 2022-11-16 LAB
ALBUMIN SERPL ELPH-MCNC: 1.1 G/DL — LOW (ref 3.3–5)
ALP SERPL-CCNC: 255 U/L — HIGH (ref 40–120)
ALT FLD-CCNC: 18 U/L — SIGNIFICANT CHANGE UP (ref 12–78)
ANION GAP SERPL CALC-SCNC: 5 MMOL/L — SIGNIFICANT CHANGE UP (ref 5–17)
APTT BLD: 30.2 SEC — SIGNIFICANT CHANGE UP (ref 27.5–35.5)
AST SERPL-CCNC: 43 U/L — HIGH (ref 15–37)
BASOPHILS # BLD AUTO: 0.01 K/UL — SIGNIFICANT CHANGE UP (ref 0–0.2)
BASOPHILS NFR BLD AUTO: 0.1 % — SIGNIFICANT CHANGE UP (ref 0–2)
BILIRUB SERPL-MCNC: 0.8 MG/DL — SIGNIFICANT CHANGE UP (ref 0.2–1.2)
BUN SERPL-MCNC: 14 MG/DL — SIGNIFICANT CHANGE UP (ref 7–23)
CALCIUM SERPL-MCNC: 8.9 MG/DL — SIGNIFICANT CHANGE UP (ref 8.5–10.1)
CHLORIDE SERPL-SCNC: 110 MMOL/L — HIGH (ref 96–108)
CO2 SERPL-SCNC: 26 MMOL/L — SIGNIFICANT CHANGE UP (ref 22–31)
CREAT SERPL-MCNC: 0.49 MG/DL — LOW (ref 0.5–1.3)
EGFR: 97 ML/MIN/1.73M2 — SIGNIFICANT CHANGE UP
EOSINOPHIL # BLD AUTO: 0 K/UL — SIGNIFICANT CHANGE UP (ref 0–0.5)
EOSINOPHIL NFR BLD AUTO: 0 % — SIGNIFICANT CHANGE UP (ref 0–6)
GLUCOSE SERPL-MCNC: 115 MG/DL — HIGH (ref 70–99)
HCT VFR BLD CALC: 24 % — LOW (ref 34.5–45)
HGB BLD-MCNC: 7.9 G/DL — LOW (ref 11.5–15.5)
IMM GRANULOCYTES NFR BLD AUTO: 2.2 % — HIGH (ref 0–0.9)
INR BLD: 1.94 RATIO — HIGH (ref 0.88–1.16)
LYMPHOCYTES # BLD AUTO: 0.74 K/UL — LOW (ref 1–3.3)
LYMPHOCYTES # BLD AUTO: 10.7 % — LOW (ref 13–44)
MCHC RBC-ENTMCNC: 22.4 PG — LOW (ref 27–34)
MCHC RBC-ENTMCNC: 32.9 GM/DL — SIGNIFICANT CHANGE UP (ref 32–36)
MCV RBC AUTO: 68.2 FL — LOW (ref 80–100)
MONOCYTES # BLD AUTO: 1.05 K/UL — HIGH (ref 0–0.9)
MONOCYTES NFR BLD AUTO: 15.1 % — HIGH (ref 2–14)
NEUTROPHILS # BLD AUTO: 4.99 K/UL — SIGNIFICANT CHANGE UP (ref 1.8–7.4)
NEUTROPHILS NFR BLD AUTO: 71.9 % — SIGNIFICANT CHANGE UP (ref 43–77)
PLATELET # BLD AUTO: 212 K/UL — SIGNIFICANT CHANGE UP (ref 150–400)
POTASSIUM SERPL-MCNC: 3.5 MMOL/L — SIGNIFICANT CHANGE UP (ref 3.5–5.3)
POTASSIUM SERPL-SCNC: 3.5 MMOL/L — SIGNIFICANT CHANGE UP (ref 3.5–5.3)
PROT SERPL-MCNC: 6.1 GM/DL — SIGNIFICANT CHANGE UP (ref 6–8.3)
PROTHROM AB SERPL-ACNC: 22.6 SEC — HIGH (ref 10.5–13.4)
RBC # BLD: 3.52 M/UL — LOW (ref 3.8–5.2)
RBC # FLD: 31.3 % — HIGH (ref 10.3–14.5)
SARS-COV-2 RNA SPEC QL NAA+PROBE: SIGNIFICANT CHANGE UP
SODIUM SERPL-SCNC: 141 MMOL/L — SIGNIFICANT CHANGE UP (ref 135–145)
VANCOMYCIN TROUGH SERPL-MCNC: 10.7 UG/ML — SIGNIFICANT CHANGE UP (ref 10–20)
WBC # BLD: 6.94 K/UL — SIGNIFICANT CHANGE UP (ref 3.8–10.5)
WBC # FLD AUTO: 6.94 K/UL — SIGNIFICANT CHANGE UP (ref 3.8–10.5)

## 2022-11-16 PROCEDURE — 99232 SBSQ HOSP IP/OBS MODERATE 35: CPT

## 2022-11-16 PROCEDURE — 99221 1ST HOSP IP/OBS SF/LOW 40: CPT

## 2022-11-16 PROCEDURE — 99223 1ST HOSP IP/OBS HIGH 75: CPT

## 2022-11-16 RX ORDER — PHYTONADIONE (VIT K1) 5 MG
5 TABLET ORAL ONCE
Refills: 0 | Status: COMPLETED | OUTPATIENT
Start: 2022-11-16 | End: 2022-11-16

## 2022-11-16 RX ADMIN — Medication 650 MILLIGRAM(S): at 16:50

## 2022-11-16 RX ADMIN — Medication 2: at 12:32

## 2022-11-16 RX ADMIN — Medication 650 MILLIGRAM(S): at 05:38

## 2022-11-16 RX ADMIN — Medication 250 MILLIGRAM(S): at 11:21

## 2022-11-16 RX ADMIN — Medication 3: at 16:44

## 2022-11-16 RX ADMIN — DEXTROSE MONOHYDRATE, SODIUM CHLORIDE, AND POTASSIUM CHLORIDE 60 MILLILITER(S): 50; .745; 4.5 INJECTION, SOLUTION INTRAVENOUS at 12:46

## 2022-11-16 RX ADMIN — Medication 250 MILLIGRAM(S): at 21:10

## 2022-11-16 RX ADMIN — INSULIN GLARGINE 5 UNIT(S): 100 INJECTION, SOLUTION SUBCUTANEOUS at 21:10

## 2022-11-16 RX ADMIN — ATENOLOL 25 MILLIGRAM(S): 25 TABLET ORAL at 09:53

## 2022-11-16 RX ADMIN — Medication 325 MILLIGRAM(S): at 09:54

## 2022-11-16 RX ADMIN — Medication 40 MILLIEQUIVALENT(S): at 09:53

## 2022-11-16 RX ADMIN — Medication 100 MILLIGRAM(S): at 19:03

## 2022-11-16 RX ADMIN — Medication 5 MILLIGRAM(S): at 21:11

## 2022-11-16 NOTE — CONSULT NOTE ADULT - ASSESSMENT
Pt is a 77y old Female with hx of IDDM , HTN, anemia , liver and splenic mass with lung and intraabdominal lymphadenopathy , chronic left upper extremity paresis/contracture seen by Dr ray neurosurgery sept 2021 and found to have spinal nerve compression at that time - spinal fusion recommended but patient an daughter declined surgery patient was brought in by daughter from home for fever 101 at home day PTA and 2 days of lethargy     Process of Care  --Reviewed dx/treatment problems and alignment with Goals of Care    Physical Aspects of Care  --Pain  patient appears comfortable at this time   c/w current managment    --Bowel Regimen  risk for constipation d/t immobility  daily dulcolax    --Dyspnea  No SOB at this time  comfortable and in NAD    --Nausea Vomiting  denies    --Weakness  PT as tolerated     Psychological and Psychiatric Aspects of Care:   --Greif/Bereavment: emotional support provided  --Hx of psychiatric dx: none  -Pastoral Care Available PRN     Social Aspects of Care  -SW involved     Cultural Aspects  -Primary Language: English    Goals of Care:     We discussed Palliative Care team being a supportive team when a patient has ongoing illnesses.  We also discussed that it is not an end of life care service, but can help navigate symptoms and emotional support througout their hospital stay here.    Ethical and Legal Aspects:   NA    Capacity- pt does not have capacity     HCP/Surrogate: NO HCP on file - patients daughter is     Code Status- full code will address  MOLST-   Dispo Plan- ongoing discussions     Discussed With: Dr. Arzate coordinated with attending and SW and RN

## 2022-11-16 NOTE — CONSULT NOTE ADULT - NS ATTEND AMEND GEN_ALL_CORE FT
pt seen and examined  in NAD confused but calm  cachectic   lung w/o wheezing or rales w/ decreased   s1sw  abdomen soft nontender   case d/w NP will reach out to daughter for further info and goc   at this time I agree w/ above note and plan as written

## 2022-11-16 NOTE — CONSULT NOTE ADULT - SUBJECTIVE AND OBJECTIVE BOX
HPI: Pt is a 77y old Female with hx of       PAIN: ( )Yes   ( )No  Level:  Location:  Intensity:    /10  Quality:  Aggravating Factors:  Alleviating Factors:  Radiation:  Duration/Timing:  Impact on ADLs:    DYSPNEA: ( ) Yes  ( ) No  Level:    PAST MEDICAL & SURGICAL HISTORY:  DM (diabetes mellitus)          SOCIAL HX:    Hx opiate tolerance ( )YES  ( )NO    Baseline ADLs  (Prior to Admission)  ( ) Independent   ( )Dependent    FAMILY HISTORY:      Review of Systems:    Anxiety-  Depression-  Physical Discomfort-  Dyspnea-  Constipation-  Diarrhea-  Nausea-  Vomiting-  Anorexia-  Weight Loss-   Cough-  Secretions-  Fatigue-  Weakness-  Delirium-    All other systems reviewed and negative  Unable to obtain/Limited due to:      PHYSICAL EXAM:    Vital Signs Last 24 Hrs  T(C): 37.6 (16 Nov 2022 07:46), Max: 39.4 (15 Nov 2022 12:14)  T(F): 99.6 (16 Nov 2022 07:46), Max: 103 (15 Nov 2022 12:14)  HR: 101 (16 Nov 2022 07:46) (82 - 101)  BP: 125/42 (16 Nov 2022 07:46) (117/44 - 137/51)  BP(mean): 71 (15 Nov 2022 22:54) (71 - 71)  RR: 18 (16 Nov 2022 07:46) (16 - 18)  SpO2: 96% (16 Nov 2022 07:46) (96% - 100%)    Parameters below as of 16 Nov 2022 07:46  Patient On (Oxygen Delivery Method): room air      Daily     Daily     PPSV2:   %  FAST:    General:  Mental Status:  HEENT:  Lungs:  Cardiac:  GI:  :  Ext:  Neuro:      LABS:                        7.9    6.94  )-----------( 212      ( 16 Nov 2022 08:26 )             24.0     11-16    141  |  110<H>  |  14  ----------------------------<  115<H>  3.5   |  26  |  0.49<L>    Ca    8.9      16 Nov 2022 08:26    TPro  6.1  /  Alb  1.1<L>  /  TBili  0.8  /  DBili  x   /  AST  43<H>  /  ALT  18  /  AlkPhos  255<H>  11-16    PT/INR - ( 16 Nov 2022 08:26 )   PT: 22.6 sec;   INR: 1.94 ratio         PTT - ( 16 Nov 2022 08:26 )  PTT:30.2 sec  Albumin: Albumin, Serum: 1.1 g/dL (11-16 @ 08:26)      Allergies    penicillins (Unknown)    Intolerances      MEDICATIONS  (STANDING):  atenolol  Tablet 25 milliGRAM(s) Oral daily  dextrose 5%. 1000 milliLiter(s) (100 mL/Hr) IV Continuous <Continuous>  dextrose 5%. 1000 milliLiter(s) (50 mL/Hr) IV Continuous <Continuous>  dextrose 50% Injectable 25 Gram(s) IV Push once  dextrose 50% Injectable 12.5 Gram(s) IV Push once  dextrose 50% Injectable 25 Gram(s) IV Push once  ferrous    sulfate 325 milliGRAM(s) Oral daily  glucagon  Injectable 1 milliGRAM(s) IntraMuscular once  insulin glargine Injectable (LANTUS) 5 Unit(s) SubCutaneous at bedtime  insulin lispro (ADMELOG) corrective regimen sliding scale   SubCutaneous three times a day before meals  insulin lispro (ADMELOG) corrective regimen sliding scale   SubCutaneous at bedtime  sodium chloride 0.45%. 1000 milliLiter(s) (50 mL/Hr) IV Continuous <Continuous>  sodium chloride 0.9% with potassium chloride 20 mEq/L 1000 milliLiter(s) (60 mL/Hr) IV Continuous <Continuous>  vancomycin  IVPB 750 milliGRAM(s) IV Intermittent every 12 hours    MEDICATIONS  (PRN):  acetaminophen     Tablet .. 650 milliGRAM(s) Oral every 6 hours PRN Temp greater or equal to 38C (100.4F), Mild Pain (1 - 3)  acetaminophen  Suppository .. 650 milliGRAM(s) Rectal every 6 hours PRN Temp greater or equal to 38C (100.4F)  aluminum hydroxide/magnesium hydroxide/simethicone Suspension 30 milliLiter(s) Oral every 4 hours PRN Dyspepsia  dextrose Oral Gel 15 Gram(s) Oral once PRN Blood Glucose LESS THAN 70 milliGRAM(s)/deciliter  melatonin 3 milliGRAM(s) Oral at bedtime PRN Insomnia  ondansetron Injectable 4 milliGRAM(s) IV Push every 8 hours PRN Nausea and/or Vomiting      RADIOLOGY/ADDITIONAL STUDIES: HPI: Pt is a 77y old Female with hx of IDDM , HTN, anemia , liver and splenic mass with lung and intrabdominal lymphadenopathy , chronic left upper extremity paresis/contracture seen by Dr ray neurosurgery sept 2021 and found to have spinal nerve compression at that time - spinal fusion recommended but patient an daughter declined surgery .patient patient was brought in by daughter from home for fever 101 at home day PTA and 2 days of lethargy , decreased po intake, dark urine , for the past two days , has not been taking her meds, she spits out her meds  Patient is under care of heme onc Dr Lopez since march 2022 for anemia - , had MRI abd/chest in Oct which showed liver and splenic mass with lung and intra-abdominal lymphadenopathy , on 11/9/22 had PET scan outpt -plan was to follow up with Dr lopez last week for arranging tissue biopsy however missed appt. Palliative medicine consulted to help establish GOC and advance care planning     11/16/2022 pt seen and examined with no family at bedside, patient appears comfortable and was happily smiling in bed, but was unable to answer any questions followed simple commands, GOC meeting scheduled for 11/17/2022 at 1PM       PAIN: ( )Yes   (x )No  DYSPNEA: ( ) Yes  (x ) No  Level:    PAST MEDICAL & SURGICAL HISTORY:  DM (diabetes mellitus)    SOCIAL HX: lives at home     Hx opiate tolerance ( )YES  ( )NO    Baseline ADLs  (Prior to Admission)  ( ) Independent   (x )Dependent    FAMILY HISTORY:      Review of Systems:    Unable to obtain/Limited due to: AMS       PHYSICAL EXAM:    Vital Signs Last 24 Hrs  T(C): 37.6 (16 Nov 2022 07:46), Max: 39.4 (15 Nov 2022 12:14)  T(F): 99.6 (16 Nov 2022 07:46), Max: 103 (15 Nov 2022 12:14)  HR: 101 (16 Nov 2022 07:46) (82 - 101)  BP: 125/42 (16 Nov 2022 07:46) (117/44 - 137/51)  BP(mean): 71 (15 Nov 2022 22:54) (71 - 71)  RR: 18 (16 Nov 2022 07:46) (16 - 18)  SpO2: 96% (16 Nov 2022 07:46) (96% - 100%)    Parameters below as of 16 Nov 2022 07:46  Patient On (Oxygen Delivery Method): room air    PPSV2: 30  %  FAST: unsure of baseline - recently worsening cognitive decline     General: elderly female in bed  Mental Status: alert pleasantly  confused oriented to self only   HEENT: mmm + temporal wasting   Lungs: diminished breath sounds b/l   Cardiac: s1s2 +   GI: nontender, nondistended, +BS   : no suprapubic tenderness   Ext: no edema   Neuro: weakness     LABS:                        7.9    6.94  )-----------( 212      ( 16 Nov 2022 08:26 )             24.0     11-16    141  |  110<H>  |  14  ----------------------------<  115<H>  3.5   |  26  |  0.49<L>    Ca    8.9      16 Nov 2022 08:26    TPro  6.1  /  Alb  1.1<L>  /  TBili  0.8  /  DBili  x   /  AST  43<H>  /  ALT  18  /  AlkPhos  255<H>  11-16    PT/INR - ( 16 Nov 2022 08:26 )   PT: 22.6 sec;   INR: 1.94 ratio         PTT - ( 16 Nov 2022 08:26 )  PTT:30.2 sec  Albumin: Albumin, Serum: 1.1 g/dL (11-16 @ 08:26)      Allergies    penicillins (Unknown)    Intolerances      MEDICATIONS  (STANDING):  atenolol  Tablet 25 milliGRAM(s) Oral daily  dextrose 5%. 1000 milliLiter(s) (100 mL/Hr) IV Continuous <Continuous>  dextrose 5%. 1000 milliLiter(s) (50 mL/Hr) IV Continuous <Continuous>  dextrose 50% Injectable 25 Gram(s) IV Push once  dextrose 50% Injectable 12.5 Gram(s) IV Push once  dextrose 50% Injectable 25 Gram(s) IV Push once  ferrous    sulfate 325 milliGRAM(s) Oral daily  glucagon  Injectable 1 milliGRAM(s) IntraMuscular once  insulin glargine Injectable (LANTUS) 5 Unit(s) SubCutaneous at bedtime  insulin lispro (ADMELOG) corrective regimen sliding scale   SubCutaneous three times a day before meals  insulin lispro (ADMELOG) corrective regimen sliding scale   SubCutaneous at bedtime  sodium chloride 0.45%. 1000 milliLiter(s) (50 mL/Hr) IV Continuous <Continuous>  sodium chloride 0.9% with potassium chloride 20 mEq/L 1000 milliLiter(s) (60 mL/Hr) IV Continuous <Continuous>  vancomycin  IVPB 750 milliGRAM(s) IV Intermittent every 12 hours    MEDICATIONS  (PRN):  acetaminophen     Tablet .. 650 milliGRAM(s) Oral every 6 hours PRN Temp greater or equal to 38C (100.4F), Mild Pain (1 - 3)  acetaminophen  Suppository .. 650 milliGRAM(s) Rectal every 6 hours PRN Temp greater or equal to 38C (100.4F)  aluminum hydroxide/magnesium hydroxide/simethicone Suspension 30 milliLiter(s) Oral every 4 hours PRN Dyspepsia  dextrose Oral Gel 15 Gram(s) Oral once PRN Blood Glucose LESS THAN 70 milliGRAM(s)/deciliter  melatonin 3 milliGRAM(s) Oral at bedtime PRN Insomnia  ondansetron Injectable 4 milliGRAM(s) IV Push every 8 hours PRN Nausea and/or Vomiting      RADIOLOGY/ADDITIONAL STUDIES:    ACC: [id number] EXAM:  XR CHEST PORTABLE URGENT 1V                        PROCEDURE DATE:  11/13/2022    INTERPRETATION:  XR CHEST URGENT DATED 11/13/2022 12:51 AM  INDICATION: 77 years-old Female with Sepsis.  PRIOR STUDIES: None  Findings/Impression:  Lungs are clear.  No large effusions or pneumothoraces  Cardiomediastinal silhouette is within normal limits.  Osseous structures are unremarkable for age.

## 2022-11-16 NOTE — CONSULT NOTE ADULT - SUBJECTIVE AND OBJECTIVE BOX
Chief Complaint:  Patient is a 77y old  Female who presents with a chief complaint of liver/splenic masses      HPI:  78 y/o F with pmhx IDDM , HTN, anemia , liver and splenic mass with lung and intrabdominal lymphadenopathy , chronic left upper extremity paresis/contracture seen by Dr ray neurosurgery sept 2021 and found to have spinal nerve compression at that time - spinal fusion recommended but patient an daughter declined surgery .patient    patient was brought in by daughter from home for fever 101 at home day PTA and 2 days of lethargy. IR consulted for biopsy.     Allergies  penicillins (Unknown)    MEDICATIONS  (STANDING):  atenolol  Tablet 25 milliGRAM(s) Oral daily  dextrose 5%. 1000 milliLiter(s) (100 mL/Hr) IV Continuous <Continuous>  dextrose 5%. 1000 milliLiter(s) (50 mL/Hr) IV Continuous <Continuous>  dextrose 50% Injectable 25 Gram(s) IV Push once  dextrose 50% Injectable 12.5 Gram(s) IV Push once  dextrose 50% Injectable 25 Gram(s) IV Push once  ferrous    sulfate 325 milliGRAM(s) Oral daily  glucagon  Injectable 1 milliGRAM(s) IntraMuscular once  insulin glargine Injectable (LANTUS) 5 Unit(s) SubCutaneous at bedtime  insulin lispro (ADMELOG) corrective regimen sliding scale   SubCutaneous three times a day before meals  insulin lispro (ADMELOG) corrective regimen sliding scale   SubCutaneous at bedtime  phytonadione   Solution 5 milliGRAM(s) Oral once  sodium chloride 0.45%. 1000 milliLiter(s) (50 mL/Hr) IV Continuous <Continuous>  sodium chloride 0.9% with potassium chloride 20 mEq/L 1000 milliLiter(s) (60 mL/Hr) IV Continuous <Continuous>  vancomycin  IVPB 750 milliGRAM(s) IV Intermittent every 12 hours    MEDICATIONS  (PRN):  acetaminophen     Tablet .. 650 milliGRAM(s) Oral every 6 hours PRN Temp greater or equal to 38C (100.4F), Mild Pain (1 - 3)  acetaminophen  Suppository .. 650 milliGRAM(s) Rectal every 6 hours PRN Temp greater or equal to 38C (100.4F)  aluminum hydroxide/magnesium hydroxide/simethicone Suspension 30 milliLiter(s) Oral every 4 hours PRN Dyspepsia  dextrose Oral Gel 15 Gram(s) Oral once PRN Blood Glucose LESS THAN 70 milliGRAM(s)/deciliter  melatonin 3 milliGRAM(s) Oral at bedtime PRN Insomnia  ondansetron Injectable 4 milliGRAM(s) IV Push every 8 hours PRN Nausea and/or Vomiting      PAST MEDICAL & SURGICAL HISTORY:  DM (diabetes mellitus)      Vital Signs Last 24 Hrs  T(C): 37.6 (16 Nov 2022 07:46), Max: 39.3 (16 Nov 2022 05:37)  T(F): 99.6 (16 Nov 2022 07:46), Max: 102.7 (16 Nov 2022 05:37)  HR: 101 (16 Nov 2022 07:46) (82 - 101)  BP: 125/42 (16 Nov 2022 07:46) (117/44 - 137/51)  BP(mean): 71 (15 Nov 2022 22:54) (71 - 71)  RR: 18 (16 Nov 2022 07:46) (16 - 18)  SpO2: 96% (16 Nov 2022 07:46) (96% - 100%)    Parameters below as of 16 Nov 2022 07:46  Patient On (Oxygen Delivery Method): room air    CBC                        7.9    6.94  )-----------( 212      ( 16 Nov 2022 08:26 )             24.0       Chemistry  11-16    141  |  110<H>  |  14  ----------------------------<  115<H>  3.5   |  26  |  0.49<L>    Ca    8.9      16 Nov 2022 08:26    TPro  6.1  /  Alb  1.1<L>  /  TBili  0.8  /  DBili  x   /  AST  43<H>  /  ALT  18  /  AlkPhos  255<H>  11-16    Coags  PT/INR - ( 16 Nov 2022 08:26 )   PT: 22.6 sec;   INR: 1.94 ratio    PTT - ( 16 Nov 2022 08:26 )  PTT:30.2 sec

## 2022-11-16 NOTE — CONSULT NOTE ADULT - PROBLEM SELECTOR RECOMMENDATION 9
- Case reviewed with Dr. Peterson. Dr. Lopez to leave CD with images in the chart tonight. IR to review images tomorrow morning to see if biopsy is possible  - Plan for now as if biopsy is happening. Keep pt NPO after midnight, repeat AM labs  - Hemoglobin should be > 8.0 for sedation  - INR Needs to be < 1.5 for biopsy, Dr. Almaraz aware  - Repeat COVID PCR today

## 2022-11-16 NOTE — PROGRESS NOTE ADULT - ASSESSMENT
# Acute on chronic severe anemia   # hx liver and spleen mass and lung and abdominal lymphadenopathy MRI abd/chest done in Oct 2022  # Coagulopathy , not on blood thinners   no evidence of gross bleed  s/p 2 units PRBC in ED, hb stable   FOBT  plan for IR biopsy tomorrow. Will discuss with daughter  PET scan neg  -Dr Lopez consult appreciated  flow cytometry neg   MRI brain with IC con no masses    # fever with lactatemia - sepsis and dehydration  present on admission  suspect from RSV URI  and UTI   still with high grade fevers partly this could also be attributed to underlying malignancy as well  E fecaelis UTI  started   IV  vanco 750 bid  CXR no PNA, UA neg, no abd tenderness, has had multiple scans recently as outpatient will obtain results from Dr lopez  ID consult appreciated    #  Acute metabolic encephalopathy /suspect underlying dementia  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 worsening dysphagia over months    sees [facility] neurology as outpatient   MRI brain IV con done 11/9 as outpatient -no masses as per heme  swallow eval- easy to chew    # Failure to thrive  declines po intake   palliative consult for GOC     #Hx HTN, DM , anemia on iron pills   continue home meds   held lisinopril   novolin changed to lantus inpatient,     # scd for vte prophylaxis   INR 2    pt is full code  daughter agreeable to feeding tube if need arises

## 2022-11-16 NOTE — CONSULT NOTE ADULT - ASSESSMENT
76yo F with new splenic/liver masses referred to IR for biopsy  - INR 1.9  - Imaging from [facility], no access to images  - Pt family for consent  - Covid PCR out of range

## 2022-11-16 NOTE — CONSULT NOTE ADULT - CONSULT REASON
GOC
splenic and liver lesions
RSV viral syndrome
76yo F with new splenic/liver masses referred to IR for biopsy

## 2022-11-16 NOTE — PROGRESS NOTE ADULT - ASSESSMENT
78 y/o F w/ PMHx DM, HTN, HLD, left hand weakness and pain 2/2 radiculopathy presents for evaluation of anemia found have direct harvey positive and anemia of inflammation now s/p 2u pRBCs given 9/2 with appropriate response now with CT c/a/p showing multiple liver and splenic lesions with LAD.    # lymphadenopathy with liver and splenic lesions  - imaging performed at [facility]   - 10/26 CT abd/pelvis with multiple masses in liver and spleen with the largest appearing to reside within segment 3, measuring approximately 4.5 cm in diameter. Multiple low­density lesions are demonstrated throughout the spleen, measuring up to approximately 2.6 cm in diameter. Portions of periportal lymphadenopathy reside adjacent to the pancreatic head, hampering its assessment. There is a mild degree of right­sided hydronephrosis and a mild to moderate degree of left­sided hydronephrosis, with the ureters resuming normal caliber at the level of the upper portions of the pelvis. Lymphadenopathy is demonstrated throughout the retroperitoneum, measuring up to at least 4.5 cm transverse diameter. In addition, periportal lymphadenopathy is demonstrated, measuring up to approximately 3.4 cm in diameter. No bone lesions  - 10/26­ CT chest ­ A focus of groundglass opacity posteriorly and superiorly within the left upper lobe (series 15, image 27) measures approximately 1.0 cm x 1.2 cm.Several enlarged mediastinal and hilar lymph nodes are demonstrated. Among these enlarged lymph nodes is a subcarinal lymph node measuring approximately 3.0 cm in transverse diameter. A right hilar lymph node measures approximately 1.4 cm transverse diameter. A right paratracheal lymph node measures approximately 1.4 cm x 1.1 cm. No effusion  - 11/11- MRI brain negative for malignancy  - Pt had PET CT performed as well on 11/10 at [facility] location   - flow cytometry negative for acute leukemia or NHL or high grade MDS  - biopsy was arranged for pt outpatient with Dr. Rodrigues, however was not performed   - Recommend IR guided biopsy while inpatient for diagnosis- would prefer to be done in house rather than scheduling outpatient given current condition and recurrent fevers- tmax 102.7  - pt with Hb 5.8,  s/p 2u pRBCs  - LDH, uric acid nl    # Microcytic anemia   - pt has h/o alpha thalassemia, has not required blood transfusions outpatient   - ANEMIA OF CHRONIC INFLAMMATION­ outpatient iron studies revealed ferritin 838, tibc 132, iron sat 13%, iron 17, negative intrinsic factor, b12 >2000, folic acid >24  ­ POSITIVE alpha thalassemia 2/4 genes    - Blood work reviewed from 9/20 revealed WBC 11.5, Hb 9.1, mcv 71, plt 309, ,   - - pt with Hb 5.8, s/p 2u pRBCs   - pt with Crp 218, ferritin 11k, all 2/2 severe inflammation from possible lymphoma/cancer/infection unlikely acute bleed    # RSV, UTI  - ID following - pt on vancomycin   - tmax 102.7 overnight   - send blood cultures  - UCx positive E faecalis     dvt ppx     Dr. Phani Lopez  cell: [phone]  Weekends and nights please call [phone] for MD on call  [facility]  Hematology/Oncology

## 2022-11-16 NOTE — PROVIDER CONTACT NOTE (CHANGE IN STATUS NOTIFICATION) - SITUATION
Patient was noted with fever. Had fever T=102.4 from rectal at 5 pm, tylenol suppository given and rechecked temp at 6 pm, T=102.9 from oral.

## 2022-11-17 ENCOUNTER — RESULT REVIEW (OUTPATIENT)
Age: 77
End: 2022-11-17

## 2022-11-17 LAB
ANION GAP SERPL CALC-SCNC: 2 MMOL/L — LOW (ref 5–17)
BUN SERPL-MCNC: 12 MG/DL — SIGNIFICANT CHANGE UP (ref 7–23)
CALCIUM SERPL-MCNC: 8.8 MG/DL — SIGNIFICANT CHANGE UP (ref 8.5–10.1)
CHLORIDE SERPL-SCNC: 107 MMOL/L — SIGNIFICANT CHANGE UP (ref 96–108)
CO2 SERPL-SCNC: 27 MMOL/L — SIGNIFICANT CHANGE UP (ref 22–31)
CREAT SERPL-MCNC: 0.46 MG/DL — LOW (ref 0.5–1.3)
EGFR: 98 ML/MIN/1.73M2 — SIGNIFICANT CHANGE UP
GLUCOSE SERPL-MCNC: 65 MG/DL — LOW (ref 70–99)
HCT VFR BLD CALC: 23.4 % — LOW (ref 34.5–45)
HGB BLD-MCNC: 7.9 G/DL — LOW (ref 11.5–15.5)
INR BLD: 1.96 RATIO — HIGH (ref 0.88–1.16)
MCHC RBC-ENTMCNC: 22.8 PG — LOW (ref 27–34)
MCHC RBC-ENTMCNC: 33.8 GM/DL — SIGNIFICANT CHANGE UP (ref 32–36)
MCV RBC AUTO: 67.6 FL — LOW (ref 80–100)
PLATELET # BLD AUTO: 191 K/UL — SIGNIFICANT CHANGE UP (ref 150–400)
POTASSIUM SERPL-MCNC: 4 MMOL/L — SIGNIFICANT CHANGE UP (ref 3.5–5.3)
POTASSIUM SERPL-SCNC: 4 MMOL/L — SIGNIFICANT CHANGE UP (ref 3.5–5.3)
PROTHROM AB SERPL-ACNC: 22.9 SEC — HIGH (ref 10.5–13.4)
RBC # BLD: 3.46 M/UL — LOW (ref 3.8–5.2)
RBC # FLD: 31.6 % — HIGH (ref 10.3–14.5)
SODIUM SERPL-SCNC: 136 MMOL/L — SIGNIFICANT CHANGE UP (ref 135–145)
WBC # BLD: 7.6 K/UL — SIGNIFICANT CHANGE UP (ref 3.8–10.5)
WBC # FLD AUTO: 7.6 K/UL — SIGNIFICANT CHANGE UP (ref 3.8–10.5)

## 2022-11-17 PROCEDURE — 99497 ADVNCD CARE PLAN 30 MIN: CPT

## 2022-11-17 PROCEDURE — 99232 SBSQ HOSP IP/OBS MODERATE 35: CPT

## 2022-11-17 PROCEDURE — 88305 TISSUE EXAM BY PATHOLOGIST: CPT | Mod: 26

## 2022-11-17 PROCEDURE — 99233 SBSQ HOSP IP/OBS HIGH 50: CPT

## 2022-11-17 PROCEDURE — 76942 ECHO GUIDE FOR BIOPSY: CPT | Mod: 26

## 2022-11-17 PROCEDURE — 99498 ADVNCD CARE PLAN ADDL 30 MIN: CPT

## 2022-11-17 PROCEDURE — 38505 NEEDLE BIOPSY LYMPH NODES: CPT

## 2022-11-17 RX ADMIN — Medication 250 MILLIGRAM(S): at 12:26

## 2022-11-17 RX ADMIN — Medication 650 MILLIGRAM(S): at 21:47

## 2022-11-17 RX ADMIN — Medication 325 MILLIGRAM(S): at 12:28

## 2022-11-17 RX ADMIN — INSULIN GLARGINE 5 UNIT(S): 100 INJECTION, SOLUTION SUBCUTANEOUS at 21:42

## 2022-11-17 RX ADMIN — Medication 650 MILLIGRAM(S): at 09:21

## 2022-11-17 RX ADMIN — Medication 250 MILLIGRAM(S): at 21:11

## 2022-11-17 RX ADMIN — Medication 1: at 17:15

## 2022-11-17 RX ADMIN — DEXTROSE MONOHYDRATE, SODIUM CHLORIDE, AND POTASSIUM CHLORIDE 60 MILLILITER(S): 50; .745; 4.5 INJECTION, SOLUTION INTRAVENOUS at 21:11

## 2022-11-17 RX ADMIN — DEXTROSE MONOHYDRATE, SODIUM CHLORIDE, AND POTASSIUM CHLORIDE 60 MILLILITER(S): 50; .745; 4.5 INJECTION, SOLUTION INTRAVENOUS at 05:45

## 2022-11-17 NOTE — PROGRESS NOTE ADULT - ASSESSMENT
Pt is a 77y old Female with hx of IDDM , HTN, anemia , liver and splenic mass with lung and intraabdominal lymphadenopathy , chronic left upper extremity paresis/contracture seen by Dr ray neurosurgery sept 2021 and found to have spinal nerve compression at that time - spinal fusion recommended but patient an daughter declined surgery patient was brought in by daughter from home for fever 101 at home day PTA and 2 days of lethargy     Process of Care  --Reviewed dx/treatment problems and alignment with Goals of Care    Physical Aspects of Care  --Pain  patient appears comfortable at this time   c/w current managment    --Bowel Regimen  risk for constipation d/t immobility  daily dulcolax    --Dyspnea  No SOB at this time  comfortable and in NAD    --Nausea Vomiting  denies    --Weakness  PT as tolerated     Psychological and Psychiatric Aspects of Care:   --Greif/Bereavment: emotional support provided  --Hx of psychiatric dx: none  -Pastoral Care Available PRN     Social Aspects of Care  -SW involved     Cultural Aspects  -Primary Language: English    Goals of Care:     We discussed Palliative Care team being a supportive team when a patient has ongoing illnesses.  We also discussed that it is not an end of life care service, but can help navigate symptoms and emotional support througout their hospital stay here.    Ethical and Legal Aspects:   NA    Capacity- pt does not have capacity     HCP/Surrogate: NO HCP on file - patients daughter is     Code Status- full code will address  MOLST-   Dispo Plan- ongoing discussions     Discussed With: Dr. Arzate coordinated with attending and SW and RN          Pt is a 77y old Female with hx of IDDM , HTN, anemia , liver and splenic mass with lung and intraabdominal lymphadenopathy , chronic left upper extremity paresis/contracture seen by Dr ray neurosurgery sept 2021 and found to have spinal nerve compression at that time - spinal fusion recommended but patient an daughter declined surgery patient was brought in by daughter from home for fever 101 at home day PTA and 2 days of lethargy     Process of Care  --Reviewed dx/treatment problems and alignment with Goals of Care    Physical Aspects of Care  --Pain  patient appears comfortable at this time   c/w current managment    --Bowel Regimen  risk for constipation d/t immobility  daily dulcolax    --Dyspnea  No SOB at this time  comfortable and in NAD    --Nausea Vomiting  denies    --Weakness  PT as tolerated     Psychological and Psychiatric Aspects of Care:   --Greif/Bereavment: emotional support provided  --Hx of psychiatric dx: none  -Pastoral Care Available PRN     Social Aspects of Care  -SW involved     Cultural Aspects  -Primary Language: English    Goals of Care:     We discussed Palliative Care team being a supportive team when a patient has ongoing illnesses.  We also discussed that it is not an end of life care service, but can help navigate symptoms and emotional support througout their hospital stay here.    Ethical and Legal Aspects:   NA    Capacity- pt does not have capacity     HCP/Surrogate: NO HCP on file - patients daughter is     Code Status- Discussed and daughter is considering placing limits/Follow up 11/18 330P  -See [facility] discussion note  MOLST- to be completed  Dispo Plan- ongoing discussions     Discussed With: Dr. Arzate coordinated with attending and SW and RN

## 2022-11-17 NOTE — PROGRESS NOTE ADULT - ASSESSMENT
# Acute on chronic severe anemia   # hx liver and spleen mass and lung and abdominal lymphadenopathy MRI abd/chest done in Oct 2022  # Coagulopathy , not on blood thinners   no evidence of gross bleed  s/p 2 units PRBC in ED, hb stable   LN biopsy today  PET scan neg  -Dr Lopez consult appreciated  flow cytometry neg   MRI brain with IC con no masses    # fever with lactatemia - sepsis and dehydration  present on admission  suspect from RSV URI  and UTI   still with high grade fevers partly this could also be attributed to underlying malignancy as well  E fecaelis UTI  started   IV  vanco 750 bid  CXR no PNA, UA neg, no abd tenderness, has had multiple scans recently as outpatient will obtain results from Dr lopez  ID consult appreciated    #  Acute metabolic encephalopathy /suspect underlying dementia  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 worsening dysphagia over months    sees [facility] neurology as outpatient   MRI brain IV con done 11/9 as outpatient -no masses as per heme  swallow eval- easy to chew    # Failure to thrive  declines po intake   palliative consult for GOC appreciated    #Hx HTN, DM , anemia on iron pills   continue home meds   held lisinopril   novolin changed to lantus inpatient,     # vte prophylaxis   INR near 2    pt is full code  daughter agreeable to feeding tube if need arises

## 2022-11-17 NOTE — GOALS OF CARE CONVERSATION - ADVANCED CARE PLANNING - CONVERSATION DETAILS
HPI:  76 y/o F with pmhx IDDM , HTN, anemia , liver and splenic mass with lung and intrabdominal lymphadenopathy , chronic left upper extremity paresis/contracture seen by Dr ray neurosurgery sept 2021 and found to have spinal nerve compression at that time - spinal fusion recommended but patient an daughter declined surgery .patient    patient was brought in by daughter from home for fever 101 at home day PTA and 2 days of lethargy , decreased po intake, dark urine , for the past two days , has not been taking her meds, she spits out her meds   (13 Nov 2022 10:38)      PERTINENT PMH REVIEWED:  [ X ] YES [ ] NO           Primary Contact:  Johnson, daughter, phone #[phone]    HCP [  ] Surrogate [ X  ] Guardian [   ]    Mental Status: Pt. lacks capacity.   Concerns of Depression [  ] -not identified.  Anxiety [   ]  -not identified.  Baseline ADLs (prior to admission):  Independent [ ] moderately [ ] fully   Dependent   [ X ] moderately [ ]fully    Family Meeting attendees: GOC held with daughterJohnson 11/17.    Anticipated Grief: Patient[  ] Family [ X ]    Caregiver [place] Assessed: Yes [ X ] No [  ]    Jainism: Unknown.    Spiritual Concerns: Not identified,  available for support.    Goals of Care: To be further determined.    Previous Services: None.    ADVANCE DIRECTIVES:   -Pt. lacks capacity  -Full Code    Anticipated D/C Plan: To be further determined.                     Summary:  Palliative team met with daughter to discuss GOC, assist with planning and provide supportive counseling.  Palliative role explained.  Emotional support provided.  Pt. lacks capacity to make medical decisions.  Pt. resides with daughter, Pt requires assistance with ADLs.  Pts cognition has been fluctuating per daughter.  Pt. recently moved from [place] with her sisters to live with daughter as she was unable to find work in [place] and had some medical concerns.  Pts 3 sons and  live in [place], daughter reports Pt. is  from  but legally .  Daughters feelings explored.  Support provided.    We discussed Pts journey thus far with her illness.  We discussed Pts current medical condition and concern regarding PO intake.  We discussed PEG placement, daughter stated she has spoken with the medical team regarding possible PEG, she states that family may desire PEG in the event it is recommended.  We discussed the biopsy taken today and treatment options to be discussed in the future.  Daughter inquired about hospice, we discussed in the event treatment options are not available changing the focus to comfort and treating symptoms as they arise with the support of hospice.  No limits currently set as daughter desires to discuss treatment options with Dr. Lopez after biopsy results.    Advance directives reviewed.  Pt does not have a HCP.  Pt. is legally  to spouse however has been  from him, daughter is involving spouse and siblings in decisions as they are surrogate decision makers.  We discussed Pts wishes regarding resuscitation and intubation, daughter shared that she is thinking more about setting limits.  She states she will discuss DNR/DNI with her dad, Pts  and brother this evening.  No limits currently set.  Pt. remains a Full Code.    Plan to be further determined.  No limits currently set.  Daughter in agreement with our team following up with her in the afternoon tomorrow.  Emotional support provided.  Our team to continue to follow. HPI:  78 y/o F with pmhx IDDM , HTN, anemia , liver and splenic mass with lung and intrabdominal lymphadenopathy , chronic left upper extremity paresis/contracture seen by Dr ray neurosurgery sept 2021 and found to have spinal nerve compression at that time - spinal fusion recommended but patient an daughter declined surgery .patient    patient was brought in by daughter from home for fever 101 at home day PTA and 2 days of lethargy , decreased po intake, dark urine , for the past two days , has not been taking her meds, she spits out her meds   (13 Nov 2022 10:38)      PERTINENT PMH REVIEWED:  [ X ] YES [ ] NO           Primary Contact:  Johnson, daughter, phone #[phone]    HCP [  ] Surrogate [ X  ] Guardian [   ]    Mental Status: Pt. lacks capacity.   Concerns of Depression [  ] -not identified.  Anxiety [   ]  -not identified.  Baseline ADLs (prior to admission):  Independent [ ] moderately [ ] fully   Dependent   [ X ] moderately [ ]fully    Family Meeting attendees: GOC held with daughterJohnson 11/17.    Anticipated Grief: Patient[  ] Family [ X ]    Caregiver [place] Assessed: Yes [ X ] No [  ]    Moravian: Unknown.    Spiritual Concerns: Not identified,  available for support.    Goals of Care: To be further determined.    Previous Services: None.    ADVANCE DIRECTIVES:   -Pt. lacks capacity  -Full Code    Anticipated D/C Plan: To be further determined.                     Summary:  Palliative team met with daughter to discuss GOC, assist with planning and provide supportive counseling.  Palliative role explained.  Emotional support provided.  Pt. lacks capacity to make medical decisions.  Pt. resides with daughter, Pt requires assistance with ADLs.  Pts cognition has been fluctuating per daughter.  Pt. recently moved from [place] with her sisters to live with daughter as she was unable to find work in [place] and had some medical concerns.  Pts 3 sons and  live in [place], daughter reports Pt. is  from  but legally .  Daughters feelings explored.  Support provided.    We discussed Pts journey thus far with her illness.  We discussed Pts current medical condition and concern regarding PO intake.  We discussed PEG placement, daughter stated she has spoken with the medical team regarding possible PEG, she states that family may desire PEG in the event it is recommended.  We discussed the biopsy taken today and treatment options to be discussed in the future.  Daughter inquired about hospice, we discussed in the event treatment options are not available changing the focus to comfort and treating symptoms as they arise with the support of hospice.  No limits currently set as daughter desires to discuss treatment options with Dr. Lopez after biopsy results.    Advance directives reviewed.  Pt does not have a HCP.  Pt. is legally  to spouse however has been  from him, daughter is involving spouse and siblings in decisions as they are surrogate decision makers.  Daughter is only relative in the [place] and will speak with team regarding family decisions.  We discussed Pts wishes regarding resuscitation and intubation, daughter shared that she is thinking more about setting limits.  She states she will discuss DNR/DNI with her dad, Pts  and brother this evening.  No limits currently set.  Pt. remains a Full Code.    Plan to be further determined.  No limits currently set.  Daughter in agreement with our team following up with her in the afternoon tomorrow.  Emotional support provided.  Our team to continue to follow. HPI:  78 y/o F with pmhx IDDM , HTN, anemia , liver and splenic mass with lung and intrabdominal lymphadenopathy , chronic left upper extremity paresis/contracture seen by Dr ray neurosurgery sept 2021 and found to have spinal nerve compression at that time - spinal fusion recommended but patient an daughter declined surgery .patient    patient was brought in by daughter from home for fever 101 at home day PTA and 2 days of lethargy , decreased po intake, dark urine , for the past two days , has not been taking her meds, she spits out her meds   (13 Nov 2022 10:38)      PERTINENT PMH REVIEWED:  [ X ] YES [ ] NO           Primary Contact:  Johnson, daughter, phone #[phone]    HCP [  ] Surrogate [ X  ] Guardian [   ]    Mental Status: Pt. lacks capacity.   Concerns of Depression [  ] -not identified.  Anxiety [   ]  -not identified.  Baseline ADLs (prior to admission):  Independent [ ] moderately [ ] fully   Dependent   [ X ] moderately [ ]fully    Family Meeting attendees: GOC held with daughterJohnson 11/17.    Anticipated Grief: Patient[  ] Family [ X ]    Caregiver [place] Assessed: Yes [ X ] No [  ]    Anabaptist: Unknown.    Spiritual Concerns: Not identified,  available for support.    Goals of Care: To be further determined.    Previous Services: None.    ADVANCE DIRECTIVES:   -Pt. lacks capacity  -Full Code    Anticipated D/C Plan: To be further determined.                     Summary:  Palliative team met with daughter to discuss GOC, assist with planning and provide supportive counseling.  Palliative role explained.  Emotional support provided.  Pt. lacks capacity to make medical decisions.  Pt. resides with daughter, Pt requires assistance with ADLs.  Pts cognition has been fluctuating per daughter.  Pt. recently moved from [place] with her sisters to live with daughter as she was unable to find work in [place] and had some medical concerns.  Pts 3 sons and  live in [place], daughter reports Pt. is  from  but legally .  Daughters feelings explored.  Support provided.    We discussed Pts journey thus far with her illness.  We discussed Pts current medical condition and concern regarding PO intake.  We discussed PEG placement, daughter stated she has spoken with the medical team regarding possible PEG, she states that family may desire PEG in the event it is recommended.  We discussed the biopsy taken today and treatment options to be discussed in the future.  Daughter inquired about hospice, we discussed in the event treatment options are not available changing the focus to comfort and treating symptoms as they arise with the support of hospice.  No limits currently set as daughter desires to discuss treatment options with Dr. Lopez after biopsy results.    Advance directives reviewed.  Pt does not have a HCP.  Pt. is legally  to spouse whom is in [place] however has been  from him, daughter is involving spouse and siblings in decisions as they are surrogate decision makers.  Daughter is only relative in the [place] and will speak with team regarding family decisions.  We discussed Pts wishes regarding resuscitation and intubation, daughter shared that she is thinking more about setting limits.  She states she will discuss DNR/DNI with her dad, Pts  and brother this evening.  No limits currently set.  Pt. remains a Full Code.    Plan to be further determined.  No limits currently set.  Daughter in agreement with our team following up with her in the afternoon tomorrow.  Emotional support provided.  Our team to continue to follow.

## 2022-11-18 LAB
CULTURE RESULTS: SIGNIFICANT CHANGE UP
CULTURE RESULTS: SIGNIFICANT CHANGE UP
SPECIMEN SOURCE: SIGNIFICANT CHANGE UP
SPECIMEN SOURCE: SIGNIFICANT CHANGE UP

## 2022-11-18 PROCEDURE — 99232 SBSQ HOSP IP/OBS MODERATE 35: CPT

## 2022-11-18 RX ADMIN — Medication 325 MILLIGRAM(S): at 11:52

## 2022-11-18 RX ADMIN — INSULIN GLARGINE 5 UNIT(S): 100 INJECTION, SOLUTION SUBCUTANEOUS at 22:47

## 2022-11-18 RX ADMIN — Medication 250 MILLIGRAM(S): at 11:49

## 2022-11-18 RX ADMIN — Medication 1: at 22:47

## 2022-11-18 RX ADMIN — Medication 250 MILLIGRAM(S): at 22:49

## 2022-11-18 RX ADMIN — Medication 2: at 11:52

## 2022-11-18 RX ADMIN — Medication 650 MILLIGRAM(S): at 17:26

## 2022-11-18 NOTE — PROVIDER CONTACT NOTE (HYPOGLYCEMIA EVENT) - NS PROVIDER CONTACT BACKGROUND-HYPO
Age: 77y    Gender: Female    POCT Blood Glucose:  92 mg/dL (11-18-22 @ 08:20)  86 mg/dL (11-18-22 @ 07:54)  66 mg/dL (11-18-22 @ 07:32)  150 mg/dL (11-17-22 @ 21:40)  194 mg/dL (11-17-22 @ 17:05)  100 mg/dL (11-17-22 @ 13:07)  69 mg/dL (11-17-22 @ 12:21)      eMAR:  insulin glargine Injectable (LANTUS)   5 Unit(s) SubCutaneous (11-17-22 @ 21:42)    insulin lispro (ADMELOG) corrective regimen sliding scale   1 Unit(s) SubCutaneous (11-17-22 @ 17:15)     [phone]

## 2022-11-18 NOTE — PROGRESS NOTE ADULT - ASSESSMENT
76 y/o F with pmhx with liver, splenic masses, enlarged lymph nodes, anemia being worked up for malignancy, L shoulder pinched nerve admitted to  with c/o of increasing weakness and fever to 101. Pt has followed up with oncology Dr. Lopez - she had recent CT and PET scan earlier this week. Here RVP positive for RVP, xray no obvious pna. UA pyuria. PCN allergy - unclear rxn. Febrile in , BP initially low and Hgb very low, repeat hgb improved. Was given vancomycin/aztreonam.    1. Viral syndrome. RSV. E faecalis UTI. PCN allergy   - no superimposed pna  - urine cx grew E faecalis  - on vancomycin [id number] day 5  - complete 5 days vancomycin and stop  - monitor temps  - supportive care  - fu cbc    2. other issues - care per medicine

## 2022-11-18 NOTE — PROGRESS NOTE ADULT - ASSESSMENT
76 y/o F w/ PMHx DM, HTN, HLD, left hand weakness and pain 2/2 radiculopathy presents for evaluation of anemia found have direct harvey positive and anemia of inflammation now s/p 2u pRBCs given 9/2 with appropriate response now with CT c/a/p showing multiple liver and splenic lesions with LAD.    # lymphadenopathy with liver and splenic lesions  - imaging performed at [facility]   - 10/26 CT abd/pelvis with multiple masses in liver and spleen with the largest appearing to reside within segment 3, measuring approximately 4.5 cm in diameter. Multiple low­density lesions are demonstrated throughout the spleen, measuring up to approximately 2.6 cm in diameter. Portions of periportal lymphadenopathy reside adjacent to the pancreatic head, hampering its assessment. There is a mild degree of right­sided hydronephrosis and a mild to moderate degree of left­sided hydronephrosis, with the ureters resuming normal caliber at the level of the upper portions of the pelvis. Lymphadenopathy is demonstrated throughout the retroperitoneum, measuring up to at least 4.5 cm transverse diameter. In addition, periportal lymphadenopathy is demonstrated, measuring up to approximately 3.4 cm in diameter. No bone lesions  - 10/26­ CT chest ­ A focus of groundglass opacity posteriorly and superiorly within the left upper lobe (series 15, image 27) measures approximately 1.0 cm x 1.2 cm.Several enlarged mediastinal and hilar lymph nodes are demonstrated. Among these enlarged lymph nodes is a subcarinal lymph node measuring approximately 3.0 cm in transverse diameter. A right hilar lymph node measures approximately 1.4 cm transverse diameter. A right paratracheal lymph node measures approximately 1.4 cm x 1.1 cm. No effusion  - 11/11- MRI brain negative for malignancy  - Pt had PET CT performed as well on 11/10 at [facility] location - discs provided to IR team   - flow cytometry negative for acute leukemia or NHL or high grade MDS  - biopsy was arranged for pt outpatient with Dr. Rodrigues, however was not performed     Plan:  - s/p IR guided biopsy of left cervical LN without complication- will f/u pathology  - pall care eval appreciated as pt has had decline in PS over the past 2 months   - LDH, uric acid nl    # Microcytic anemia   - pt has h/o alpha thalassemia, has not required blood transfusions outpatient   - ANEMIA OF CHRONIC INFLAMMATION­ outpatient iron studies revealed ferritin 838, tibc 132, iron sat 13%, iron 17, negative intrinsic factor, b12 >2000, folic acid >24  ­ POSITIVE alpha thalassemia 2/4 genes    - Blood work reviewed from 9/20 revealed WBC 11.5, Hb 9.1, mcv 71, plt 309, ,   - - pt with Hb 5.8, s/p 2u pRBCs - H/H stable 7.9  - pt with Crp 218, ferritin 11k, all 2/2 severe inflammation from possible lymphoma/cancer/infection unlikely acute bleed    # RSV, UTI  - ID following - pt on vancomycin   - tmax 100.8 overnight - still spiking fevers from infection vs tumor fevers  - blood cultures NTD  - UCx positive E faecalis     dvt ppx     Dr. Phani Lopez  cell: [phone]  Weekends and nights please call [phone] for MD on call  [facility]  Hematology/Oncology

## 2022-11-18 NOTE — PROGRESS NOTE ADULT - ASSESSMENT
# Acute on chronic severe anemia   # hx liver and spleen mass and lung and abdominal lymphadenopathy MRI abd/chest done in Oct 2022  # Coagulopathy , not on blood thinners   no evidence of gross bleed  s/p 2 units PRBC in ED, hb stable   S/P LN biopsy yesterday  PET scan neg  -Dr Lopez consult appreciated  flow cytometry neg   MRI brain with IC con no masses    # fever with lactatemia - sepsis and dehydration  present on admission  suspect from RSV URI  and UTI   still with high grade fevers partly this could also be attributed to underlying malignancy as well  E fecaelis UTI  started   IV  vanco 750 bid, continue for one more day  CXR no PNA, UA neg, no abd tenderness, has had multiple scans recently as outpatient will obtain results from Dr lopez  ID consult appreciated    #  Acute metabolic encephalopathy /suspect underlying dementia  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 worsening dysphagia over months    sees [facility] neurology as outpatient   MRI brain IV con done 11/9 as outpatient -no masses as per heme  swallow eval- easy to chew    # Failure to thrive  declines po intake   palliative consult for GOC appreciated    #Hx HTN, DM , anemia on iron pills   continue home meds   held lisinopril   novolin changed to lantus inpatient,     # vte prophylaxis   INR near 2    pt is full code  Possible di/c on Monday to rehab

## 2022-11-19 LAB — VANCOMYCIN TROUGH SERPL-MCNC: 13.5 UG/ML — SIGNIFICANT CHANGE UP (ref 10–20)

## 2022-11-19 PROCEDURE — 99232 SBSQ HOSP IP/OBS MODERATE 35: CPT

## 2022-11-19 RX ADMIN — Medication 650 MILLIGRAM(S): at 17:22

## 2022-11-19 RX ADMIN — ATENOLOL 25 MILLIGRAM(S): 25 TABLET ORAL at 11:13

## 2022-11-19 RX ADMIN — Medication 250 MILLIGRAM(S): at 11:13

## 2022-11-19 RX ADMIN — Medication 2: at 17:21

## 2022-11-19 RX ADMIN — Medication 650 MILLIGRAM(S): at 17:50

## 2022-11-19 RX ADMIN — Medication 250 MILLIGRAM(S): at 23:14

## 2022-11-19 RX ADMIN — Medication 325 MILLIGRAM(S): at 11:13

## 2022-11-19 NOTE — PROGRESS NOTE ADULT - ASSESSMENT
# Acute on chronic severe anemia   # hx liver and spleen mass and lung and abdominal lymphadenopathy MRI abd/chest done in Oct 2022  # Coagulopathy , not on blood thinners   no evidence of gross bleed  s/p 2 units PRBC in ED, hb stable   S/P LN biopsy yesterday  PET scan neg  -Dr Lopez consult appreciated  flow cytometry neg   MRI brain with IC con no masses    # fever with lactatemia - sepsis and dehydration  present on admission  suspect from RSV URI  and UTI   still with high grade fevers partly this could also be attributed to underlying malignancy as well  E fecaelis UTI  started   IV  vanco 750 bid, last day course  CXR no PNA, UA neg, no abd tenderness, has had multiple scans recently as outpatient will obtain results from Dr lopez  ID consult appreciated    #  Acute metabolic encephalopathy /suspect underlying dementia  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 worsening dysphagia over months    sees [facility] neurology as outpatient   MRI brain IV con done 11/9 as outpatient -no masses as per heme  swallow eval- easy to chew    # Failure to thrive  declines po intake   palliative consult for GOC appreciated    #Hx HTN, DM , anemia on iron pills   continue home meds   held lisinopril   novolin changed to lantus inpatient,     # vte prophylaxis   INR near 2    pt is full code  Possible di/c on Monday to rehab

## 2022-11-20 PROCEDURE — 99232 SBSQ HOSP IP/OBS MODERATE 35: CPT

## 2022-11-20 RX ORDER — ACETAMINOPHEN 500 MG
1000 TABLET ORAL ONCE
Refills: 0 | Status: COMPLETED | OUTPATIENT
Start: 2022-11-20 | End: 2022-11-20

## 2022-11-20 RX ADMIN — Medication 650 MILLIGRAM(S): at 06:31

## 2022-11-20 RX ADMIN — Medication 1: at 11:05

## 2022-11-20 RX ADMIN — Medication 650 MILLIGRAM(S): at 16:55

## 2022-11-20 RX ADMIN — Medication 325 MILLIGRAM(S): at 10:54

## 2022-11-20 RX ADMIN — Medication 400 MILLIGRAM(S): at 20:23

## 2022-11-20 RX ADMIN — Medication 650 MILLIGRAM(S): at 05:58

## 2022-11-20 NOTE — PROGRESS NOTE ADULT - ASSESSMENT
# Acute on chronic severe anemia   # hx liver and spleen mass and lung and abdominal lymphadenopathy MRI abd/chest done in Oct 2022  # Coagulopathy , not on blood thinners   no evidence of gross bleed  s/p 2 units PRBC in ED, hb stable   S/P LN biopsy, follow report  PET scan neg  -Dr Lopez consult appreciated  flow cytometry neg   MRI brain with IC con no masses    # fever with lactatemia - sepsis and dehydration  present on admission  suspect from RSV URI  and UTI   still with occasional high grade fevers partly this could also be attributed to underlying malignancy as well  E fecaelis UTI  started   IV  vanco 750 bid, completed  CXR no PNA, UA neg, no abd tenderness, has had multiple scans recently as outpatient will obtain results from Dr lopez  ID consult appreciated    #  Acute metabolic encephalopathy /suspect underlying dementia  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 worsening dysphagia over months    sees [facility] neurology as outpatient   MRI brain IV con done 11/9 as outpatient -no masses as per heme  swallow eval- easy to chew    # Failure to thrive  declines po intake   palliative consult for GOC appreciated    #Hx HTN, DM , anemia on iron pills   continue home meds   held lisinopril   novolin changed to lantus inpatient,     # vte prophylaxis   INR near 2    pt is full code  Possible d/c Monday to rehab

## 2022-11-20 NOTE — PROVIDER CONTACT NOTE (MEDICATION) - BACKGROUND
Pt brought in by family for fever, poor po intake. Dx of +UTI and +RSV. Hypoglycemic event morning of 11/18/2022.  HS Lantus held on 11/19/2022 with BG of 105 with good effect.

## 2022-11-21 LAB
ALBUMIN SERPL ELPH-MCNC: 1 G/DL — LOW (ref 3.3–5)
ALP SERPL-CCNC: 494 U/L — HIGH (ref 40–120)
ALT FLD-CCNC: 43 U/L — SIGNIFICANT CHANGE UP (ref 12–78)
ANION GAP SERPL CALC-SCNC: 4 MMOL/L — LOW (ref 5–17)
AST SERPL-CCNC: 96 U/L — HIGH (ref 15–37)
BASOPHILS # BLD AUTO: 0.02 K/UL — SIGNIFICANT CHANGE UP (ref 0–0.2)
BASOPHILS NFR BLD AUTO: 0.2 % — SIGNIFICANT CHANGE UP (ref 0–2)
BILIRUB SERPL-MCNC: 1 MG/DL — SIGNIFICANT CHANGE UP (ref 0.2–1.2)
BUN SERPL-MCNC: 15 MG/DL — SIGNIFICANT CHANGE UP (ref 7–23)
CALCIUM SERPL-MCNC: 9.4 MG/DL — SIGNIFICANT CHANGE UP (ref 8.5–10.1)
CHLORIDE SERPL-SCNC: 103 MMOL/L — SIGNIFICANT CHANGE UP (ref 96–108)
CO2 SERPL-SCNC: 31 MMOL/L — SIGNIFICANT CHANGE UP (ref 22–31)
CREAT SERPL-MCNC: 0.63 MG/DL — SIGNIFICANT CHANGE UP (ref 0.5–1.3)
CULTURE RESULTS: SIGNIFICANT CHANGE UP
CULTURE RESULTS: SIGNIFICANT CHANGE UP
EGFR: 91 ML/MIN/1.73M2 — SIGNIFICANT CHANGE UP
EOSINOPHIL # BLD AUTO: 0.01 K/UL — SIGNIFICANT CHANGE UP (ref 0–0.5)
EOSINOPHIL NFR BLD AUTO: 0.1 % — SIGNIFICANT CHANGE UP (ref 0–6)
GLUCOSE SERPL-MCNC: 206 MG/DL — HIGH (ref 70–99)
HCT VFR BLD CALC: 24.1 % — LOW (ref 34.5–45)
HGB BLD-MCNC: 8 G/DL — LOW (ref 11.5–15.5)
IMM GRANULOCYTES NFR BLD AUTO: 1.8 % — HIGH (ref 0–0.9)
LYMPHOCYTES # BLD AUTO: 0.81 K/UL — LOW (ref 1–3.3)
LYMPHOCYTES # BLD AUTO: 9.7 % — LOW (ref 13–44)
MCHC RBC-ENTMCNC: 22.7 PG — LOW (ref 27–34)
MCHC RBC-ENTMCNC: 33.2 GM/DL — SIGNIFICANT CHANGE UP (ref 32–36)
MCV RBC AUTO: 68.5 FL — LOW (ref 80–100)
MONOCYTES # BLD AUTO: 1.07 K/UL — HIGH (ref 0–0.9)
MONOCYTES NFR BLD AUTO: 12.8 % — SIGNIFICANT CHANGE UP (ref 2–14)
NEUTROPHILS # BLD AUTO: 6.27 K/UL — SIGNIFICANT CHANGE UP (ref 1.8–7.4)
NEUTROPHILS NFR BLD AUTO: 75.4 % — SIGNIFICANT CHANGE UP (ref 43–77)
PLATELET # BLD AUTO: 216 K/UL — SIGNIFICANT CHANGE UP (ref 150–400)
POTASSIUM SERPL-MCNC: 3.9 MMOL/L — SIGNIFICANT CHANGE UP (ref 3.5–5.3)
POTASSIUM SERPL-SCNC: 3.9 MMOL/L — SIGNIFICANT CHANGE UP (ref 3.5–5.3)
PROT SERPL-MCNC: 6.4 GM/DL — SIGNIFICANT CHANGE UP (ref 6–8.3)
RBC # BLD: 3.52 M/UL — LOW (ref 3.8–5.2)
RBC # FLD: 31.6 % — HIGH (ref 10.3–14.5)
SARS-COV-2 RNA SPEC QL NAA+PROBE: SIGNIFICANT CHANGE UP
SODIUM SERPL-SCNC: 138 MMOL/L — SIGNIFICANT CHANGE UP (ref 135–145)
SPECIMEN SOURCE: SIGNIFICANT CHANGE UP
SPECIMEN SOURCE: SIGNIFICANT CHANGE UP
WBC # BLD: 8.35 K/UL — SIGNIFICANT CHANGE UP (ref 3.8–10.5)
WBC # FLD AUTO: 8.35 K/UL — SIGNIFICANT CHANGE UP (ref 3.8–10.5)

## 2022-11-21 PROCEDURE — 99232 SBSQ HOSP IP/OBS MODERATE 35: CPT

## 2022-11-21 RX ADMIN — Medication 1: at 07:33

## 2022-11-21 RX ADMIN — ATENOLOL 25 MILLIGRAM(S): 25 TABLET ORAL at 10:31

## 2022-11-21 RX ADMIN — Medication 325 MILLIGRAM(S): at 10:31

## 2022-11-21 RX ADMIN — Medication 3: at 17:12

## 2022-11-21 RX ADMIN — INSULIN GLARGINE 5 UNIT(S): 100 INJECTION, SOLUTION SUBCUTANEOUS at 22:13

## 2022-11-21 RX ADMIN — Medication 650 MILLIGRAM(S): at 22:13

## 2022-11-21 NOTE — PROGRESS NOTE ADULT - ASSESSMENT
76 y/o F w/ PMHx DM, HTN, HLD, left hand weakness and pain 2/2 radiculopathy presents for evaluation of anemia found have direct harvey positive and anemia of inflammation now s/p 2u pRBCs given 9/2 with appropriate response now with CT c/a/p showing multiple liver and splenic lesions with LAD nwo with preliminary pathology read s/f HODGKINS LYMPHOMA.    # HODGKINS LYMPHOMA- advanced stage   - lymphadenopathy with liver and splenic lesions  - imaging performed at [facility]   - 10/26 CT abd/pelvis with multiple masses in liver and spleen with the largest appearing to reside within segment 3, measuring approximately 4.5 cm in diameter. Multiple low­density lesions are demonstrated throughout the spleen, measuring up to approximately 2.6 cm in diameter. Portions of periportal lymphadenopathy reside adjacent to the pancreatic head, hampering its assessment. There is a mild degree of right­sided hydronephrosis and a mild to moderate degree of left­sided hydronephrosis, with the ureters resuming normal caliber at the level of the upper portions of the pelvis. Lymphadenopathy is demonstrated throughout the retroperitoneum, measuring up to at least 4.5 cm transverse diameter. In addition, periportal lymphadenopathy is demonstrated, measuring up to approximately 3.4 cm in diameter. No bone lesions  - 10/26­ CT chest ­ A focus of groundglass opacity posteriorly and superiorly within the left upper lobe (series 15, image 27) measures approximately 1.0 cm x 1.2 cm.Several enlarged mediastinal and hilar lymph nodes are demonstrated. Among these enlarged lymph nodes is a subcarinal lymph node measuring approximately 3.0 cm in transverse diameter. A right hilar lymph node measures approximately 1.4 cm transverse diameter. A right paratracheal lymph node measures approximately 1.4 cm x 1.1 cm. No effusion  - 11/11- MRI brain negative for malignancy  - Pt had PET CT performed as well on 11/10 at [facility] location   - flow cytometry negative for acute leukemia or NHL or high grade MDS  - biopsy was arranged for pt outpatient with Dr. Rodrigues, however was not performed     Plan:  - s/p IR guided biopsy of left cervical LN without complication- discussed with pathology and significant for HODGKINS LYMPHOMA  - will need to speak with family today regarding goals as pt frail, spiking high fevers but would consider treatment with AVD (likely omit bleomycin given frailty to avoid pulmonary toxicity)   - other options include brentuximab vedotin +/- AVD single agent 1.2 mg/kg q 2 weeks due to frailty with good responses   - pall care eval appreciated as pt has had decline in PS over the past 2 months   - LDH, uric acid nl    # Microcytic anemia   - pt has h/o alpha thalassemia, has not required blood transfusions outpatient   - ANEMIA OF CHRONIC INFLAMMATION­ outpatient iron studies revealed ferritin 838, tibc 132, iron sat 13%, iron 17, negative intrinsic factor, b12 >2000, folic acid >24  ­ POSITIVE alpha thalassemia 2/4 genes    - Blood work reviewed from 9/20 revealed WBC 11.5, Hb 9.1, mcv 71, plt 309, ,   - - pt with Hb 5.8, s/p 2u pRBCs - H/H stable 8 today   - pt with Crp 218, ferritin 11k, all 2/2 severe inflammation from possible lymphoma/cancer/infection unlikely acute bleed    # RSV, UTI  - ID following - pt on vancomycin   - pt still spiking fevers likely related to HL   - blood cultures NTD  - UCx positive E faecalis     dvt ppx     Dr. Phani Lopez  cell: [phone]  Weekends and nights please call [phone] for MD on call  [facility]  Hematology/Oncology    76 y/o F w/ PMHx DM, HTN, HLD, left hand weakness and pain 2/2 radiculopathy presents for evaluation of anemia found have direct harvey positive and anemia of inflammation now s/p 2u pRBCs given 9/2 with appropriate response now with CT c/a/p showing multiple liver and splenic lesions with LAD nwo with preliminary pathology read s/f HODGKINS LYMPHOMA.    # HODGKINS LYMPHOMA- advanced stage   - lymphadenopathy with liver and splenic lesions  - imaging performed at [facility]   - 10/26 CT abd/pelvis with multiple masses in liver and spleen with the largest appearing to reside within segment 3, measuring approximately 4.5 cm in diameter. Multiple low­density lesions are demonstrated throughout the spleen, measuring up to approximately 2.6 cm in diameter. Portions of periportal lymphadenopathy reside adjacent to the pancreatic head, hampering its assessment. There is a mild degree of right­sided hydronephrosis and a mild to moderate degree of left­sided hydronephrosis, with the ureters resuming normal caliber at the level of the upper portions of the pelvis. Lymphadenopathy is demonstrated throughout the retroperitoneum, measuring up to at least 4.5 cm transverse diameter. In addition, periportal lymphadenopathy is demonstrated, measuring up to approximately 3.4 cm in diameter. No bone lesions  - 10/26­ CT chest ­ A focus of groundglass opacity posteriorly and superiorly within the left upper lobe (series 15, image 27) measures approximately 1.0 cm x 1.2 cm.Several enlarged mediastinal and hilar lymph nodes are demonstrated. Among these enlarged lymph nodes is a subcarinal lymph node measuring approximately 3.0 cm in transverse diameter. A right hilar lymph node measures approximately 1.4 cm transverse diameter. A right paratracheal lymph node measures approximately 1.4 cm x 1.1 cm. No effusion  - 11/11- MRI brain negative for malignancy  - Pt had PET CT performed as well on 11/10 at [facility] location   - flow cytometry negative for acute leukemia or NHL or high grade MDS  - biopsy was arranged for pt outpatient with Dr. Rodrigues, however was not performed     Plan:  - s/p IR guided biopsy of left cervical LN without complication- discussed with pathology and significant for HODGKINS LYMPHOMA  - discussed with daughter tonight regarding goals  - we discussed options such as AVD (likely omit bleomycin given frailty to avoid pulmonary toxicity)+/- brentuximab vs best supportive care and hospice   - After discussion with daughter, she wants to take patient home with home services, and wants to try her best to get her to [place]- would recommend PT in am again to evaluate strength for patient - I discussed that this will be difficult given her frail nature, malnutrition with albumin 1 but she is aware and is willing to care for her at home and try given that pts  is in [place] as well - recommend dc tomorrow home with servces as well as discussion with hospice   - pall care emmanuel appreciated as pt has had decline in PS over the past 2 months   - LDH, uric acid nl    # Microcytic anemia   - pt has h/o alpha thalassemia, has not required blood transfusions outpatient   - ANEMIA OF CHRONIC INFLAMMATION­ outpatient iron studies revealed ferritin 838, tibc 132, iron sat 13%, iron 17, negative intrinsic factor, b12 >2000, folic acid >24  ­ POSITIVE alpha thalassemia 2/4 genes    - Blood work reviewed from 9/20 revealed WBC 11.5, Hb 9.1, mcv 71, plt 309, ,   - - pt with Hb 5.8, s/p 2u pRBCs - H/H stable 8 today   - pt with Crp 218, ferritin 11k, all 2/2 severe inflammation from possible lymphoma/cancer/infection unlikely acute bleed    # RSV, UTI  - fevers likely 2/2 HL  - consider abx on dc     dvt ppx     Dr. Phani Lopez  cell: [phone]  Weekends and nights please call [phone] for MD on call  [facility]  Hematology/Oncology

## 2022-11-21 NOTE — PROGRESS NOTE ADULT - ASSESSMENT
# Acute on chronic severe anemia   # hx liver and spleen mass and lung and abdominal lymphadenopathy MRI abd/chest done in Oct 2022 due to HL  # Coagulopathy , not on blood thinners   no evidence of gross bleed  s/p 2 units PRBC in ED, hb stable   S/P LN biopsy-HL  PET scan neg  -Dr Lopez follow up appreciated  flow cytometry neg  MRI brain with IC con no masses  Transfer to  if daughter agreeable for inpatient chemo    # sepsis and dehydration  present on admission  E fecaelis UTI  Completed IV  vanco   ID consult appreciated    #  Acute metabolic encephalopathy /suspect underlying dementia  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 worsening dysphagia over months    sees [facility]y as outpatient   MRI brain IV con done 11/9 as outpatient -no masses as per heme  swallow eval- easy to chew    # Failure to thrive  declines po intake   palliative consult for GOC appreciated    #Hx HTN, DM , anemia on iron pills   continue home meds   held lisinopril     # vte prophylaxis   INR near 2    pt is full code    Disposition: follow oncology recommendation regarding inpatient chemo therapy

## 2022-11-22 LAB
ANION GAP SERPL CALC-SCNC: 4 MMOL/L — LOW (ref 5–17)
BUN SERPL-MCNC: 16 MG/DL — SIGNIFICANT CHANGE UP (ref 7–23)
CALCIUM SERPL-MCNC: 9.7 MG/DL — SIGNIFICANT CHANGE UP (ref 8.5–10.1)
CHLORIDE SERPL-SCNC: 104 MMOL/L — SIGNIFICANT CHANGE UP (ref 96–108)
CO2 SERPL-SCNC: 31 MMOL/L — SIGNIFICANT CHANGE UP (ref 22–31)
CREAT SERPL-MCNC: 0.64 MG/DL — SIGNIFICANT CHANGE UP (ref 0.5–1.3)
EGFR: 91 ML/MIN/1.73M2 — SIGNIFICANT CHANGE UP
GLUCOSE SERPL-MCNC: 143 MG/DL — HIGH (ref 70–99)
HCT VFR BLD CALC: 25.9 % — LOW (ref 34.5–45)
HGB BLD-MCNC: 8.3 G/DL — LOW (ref 11.5–15.5)
MCHC RBC-ENTMCNC: 22.1 PG — LOW (ref 27–34)
MCHC RBC-ENTMCNC: 32 GM/DL — SIGNIFICANT CHANGE UP (ref 32–36)
MCV RBC AUTO: 69.1 FL — LOW (ref 80–100)
PLATELET # BLD AUTO: 244 K/UL — SIGNIFICANT CHANGE UP (ref 150–400)
POTASSIUM SERPL-MCNC: 3.8 MMOL/L — SIGNIFICANT CHANGE UP (ref 3.5–5.3)
POTASSIUM SERPL-SCNC: 3.8 MMOL/L — SIGNIFICANT CHANGE UP (ref 3.5–5.3)
RBC # BLD: 3.75 M/UL — LOW (ref 3.8–5.2)
RBC # FLD: 31.6 % — HIGH (ref 10.3–14.5)
SODIUM SERPL-SCNC: 139 MMOL/L — SIGNIFICANT CHANGE UP (ref 135–145)
WBC # BLD: 9.15 K/UL — SIGNIFICANT CHANGE UP (ref 3.8–10.5)
WBC # FLD AUTO: 9.15 K/UL — SIGNIFICANT CHANGE UP (ref 3.8–10.5)

## 2022-11-22 PROCEDURE — 99232 SBSQ HOSP IP/OBS MODERATE 35: CPT

## 2022-11-22 RX ADMIN — Medication 650 MILLIGRAM(S): at 11:08

## 2022-11-22 RX ADMIN — Medication 325 MILLIGRAM(S): at 10:57

## 2022-11-22 RX ADMIN — Medication 650 MILLIGRAM(S): at 14:30

## 2022-11-22 RX ADMIN — Medication 1: at 13:19

## 2022-11-22 RX ADMIN — Medication 650 MILLIGRAM(S): at 12:33

## 2022-11-22 RX ADMIN — Medication 650 MILLIGRAM(S): at 13:08

## 2022-11-22 NOTE — GOALS OF CARE CONVERSATION - ADVANCED CARE PLANNING - CONVERSATION DETAILS
Spoke with daughterJohnson via telephone this date for follow up and support.  Reviewed palliative SW role.  Emotional support provided.  Daughter shared what she has discussed with the medical team including recommendations to return home with hospice.  We discussed the philosophy of hospice and the services hospice provides at home.  Daughter shared that family is also discussing Pt. returning to [place] however her brother is currently in [place] and he would provide assistance in [place].  Pt. was denied [facility], P2P to be completed.  Daughter states she is hoping Pt. to be accepted to [facility] therefore she can become as strong as possible in hopes to transition to [place] or home with services.  Plan to be further determined.      Advance directives reviewed.  Daughter shared that family discussed code status and know that Pt. would not want to be on a ventilator.  MOLST completed to reflect DNR/DNI.      Plan to be determined.  Emotional support provided.  Our team to continue to follow.

## 2022-11-22 NOTE — PROVIDER CONTACT NOTE (OTHER) - ASSESSMENT
Pt has been febrile for days.
at 00:30 rectal temp 95.6F. MONTEZ Medina aware, orders placed for 2hr warming blanket and recheck rectal temp at 02:30.   Rectal temp at 02:30 97.1 F, MONTEZ Medina aware.
Pt now has rectal temp of 95.6F.

## 2022-11-22 NOTE — PROGRESS NOTE ADULT - ASSESSMENT
78 y/o F w/ PMHx DM, HTN, HLD, left hand weakness and pain 2/2 radiculopathy presents for evaluation of anemia found have direct harvey positive and anemia of inflammation now s/p 2u pRBCs given 9/2 with appropriate response now with CT c/a/p showing multiple liver and splenic lesions with LAD nwo with preliminary pathology read s/f HODGKINS LYMPHOMA.    # HODGKINS LYMPHOMA- advanced stage   - lymphadenopathy with liver and splenic lesions  - imaging performed at [facility]   - 10/26 CT abd/pelvis with multiple masses in liver and spleen with the largest appearing to reside within segment 3, measuring approximately 4.5 cm in diameter. Multiple low­density lesions are demonstrated throughout the spleen, measuring up to approximately 2.6 cm in diameter. Portions of periportal lymphadenopathy reside adjacent to the pancreatic head, hampering its assessment. There is a mild degree of right­sided hydronephrosis and a mild to moderate degree of left­sided hydronephrosis, with the ureters resuming normal caliber at the level of the upper portions of the pelvis. Lymphadenopathy is demonstrated throughout the retroperitoneum, measuring up to at least 4.5 cm transverse diameter. In addition, periportal lymphadenopathy is demonstrated, measuring up to approximately 3.4 cm in diameter. No bone lesions  - 10/26­ CT chest ­ A focus of groundglass opacity posteriorly and superiorly within the left upper lobe (series 15, image 27) measures approximately 1.0 cm x 1.2 cm.Several enlarged mediastinal and hilar lymph nodes are demonstrated. Among these enlarged lymph nodes is a subcarinal lymph node measuring approximately 3.0 cm in transverse diameter. A right hilar lymph node measures approximately 1.4 cm transverse diameter. A right paratracheal lymph node measures approximately 1.4 cm x 1.1 cm. No effusion  - 11/11- MRI brain negative for malignancy  - Pt had PET CT performed as well on 11/10 at [facility] location   - flow cytometry negative for acute leukemia or NHL or high grade MDS  - biopsy was arranged for pt outpatient with Dr. Rodrigues, however was not performed   - s/p IR guided biopsy of left cervical LN without complication- discussed with pathology and significant for HODGKINS LYMPHOMA    Plan:  - discussed with daughter regarding goals- will plan for home hospice with services as pt would like to try and get pt to [place] - discussed with CM this am to make referral   - we discussed options such as AVD (likely omit bleomycin given frailty to avoid pulmonary toxicity)+/- brentuximab vs best supportive care and hospice   - After discussion with daughter, she wants to take patient home with home services, and wants to try her best to get her to [place]- would recommend PT in am again to evaluate strength for patient - I discussed that this will be difficult given her frail nature, malnutrition with albumin 1 but she is aware and is willing to care for her at home and try given that pts  is in [place] as well - recommend dc home with home hospice services  - pall care eval appreciated as pt has had decline in PS over the past 2 months   - LDH, uric acid nl    # Microcytic anemia   - pt has h/o alpha thalassemia, has not required blood transfusions outpatient   - ANEMIA OF CHRONIC INFLAMMATION­ outpatient iron studies revealed ferritin 838, tibc 132, iron sat 13%, iron 17, negative intrinsic factor, b12 >2000, folic acid >24  ­ POSITIVE alpha thalassemia 2/4 genes    - Blood work reviewed from 9/20 revealed WBC 11.5, Hb 9.1, mcv 71, plt 309, ,   - - pt with Hb 5.8, s/p 2u pRBCs - H/H stable 8  - pt with Crp 218, ferritin 11k, all 2/2 severe inflammation from possible lymphoma/cancer/infection unlikely acute bleed    # RSV, UTI  - fevers likely 2/2 HL  - consider abx on dc     dvt ppx     Dr. Phani Lopez  cell: [phone]  Weekends and nights please call [phone] for MD on call  [facility]  Hematology/Oncology

## 2022-11-22 NOTE — PROGRESS NOTE ADULT - ASSESSMENT
Pt is a 77y old Female with hx of IDDM , HTN, anemia , liver and splenic mass with lung and intraabdominal lymphadenopathy , chronic left upper extremity paresis/contracture seen by Dr ray neurosurgery sept 2021 and found to have spinal nerve compression at that time - spinal fusion recommended but patient an daughter declined surgery patient was brought in by daughter from home for fever 101 at home day PTA and 2 days of lethargy     Process of Care  --Reviewed dx/treatment problems and alignment with Goals of Care    Physical Aspects of Care  --Pain  patient appears comfortable at this time   c/w current managment    --Bowel Regimen  risk for constipation d/t immobility  daily dulcolax    --Dyspnea  No SOB at this time  comfortable and in NAD    --Nausea Vomiting  denies    --Weakness  PT as tolerated     Psychological and Psychiatric Aspects of Care:   --Greif/Bereavment: emotional support provided  --Hx of psychiatric dx: none  -Pastoral Care Available PRN     Social Aspects of Care  -SW involved     Cultural Aspects  -Primary Language: English    Goals of Care:     We discussed Palliative Care team being a supportive team when a patient has ongoing illnesses.  We also discussed that it is not an end of life care service, but can help navigate symptoms and emotional support througout their hospital stay here.    Ethical and Legal Aspects:   NA    Capacity- pt does not have capacity     HCP/Surrogate: NO HCP on file - patients daughter is     Code Status- DNR/DNI  -See GOC discussion note  MOLST completed on chart  Dispo Plan- ongoing discussions   -EVA when able    Discussed With: Dr. Wallace

## 2022-11-22 NOTE — PROGRESS NOTE ADULT - NS ATTEND AMEND GEN_ALL_CORE FT
pt in NAD  lungs w/o wehezing or rales  cards no m/g/r    s1s2 +   abodmen soft nontender  family meeting on 11/18 to follow up w/ goc at this time pt remains fullcode  c/w current management   time spent       Time Spent: 35 minutes including the care, coordination and counseling of this patient, 50% of which was spent coordinating and counseling.
pt in NAD  lungs w/o wehezing or rales  cards no m/g/r    s1s2 +   abodmen soft nontender  family meeting on 11/18 to follow up w/ goc at this time pt remains fullcode  c/w current management   time spent       Time Spent: 35 minutes including the care, coordination and counseling of this patient, 50% of which was spent coordinating and counseling.

## 2022-11-22 NOTE — PROVIDER CONTACT NOTE (OTHER) - ACTION/TREATMENT ORDERED:
PA gave verbal order to hold lantus for tonight
As per MONTEZ Medina, warming blanket in place for 2 hrs; recheck body temp, and CBC ordered.
Warming blanket removed.
Hold lantus.

## 2022-11-22 NOTE — PROVIDER CONTACT NOTE (OTHER) - BACKGROUND
Patient brought to ED for fever, lethargy, poor po intake. Dx +RSV and chronic anemia.
Pt is not eating, poor appetite and is not on fluids
Brought to ED om11/13/2022 fo fever, poor po intake, and lethargy. Dx +RSV and chronic anemia.
Pt eats little to no food. Previous hypoglycemic event.

## 2022-11-22 NOTE — PROGRESS NOTE ADULT - NUTRITIONAL ASSESSMENT
This patient has been assessed with a concern for Malnutrition and has been determined to have a diagnosis/diagnoses of Severe protein-calorie malnutrition and Underweight (BMI < 19).    This patient is being managed with:   Diet Easy to Chew-  Consistent Carbohydrate {No Snacks} (CSTCHO)  Entered: Nov 13 2022  5:34PM    
This patient has been assessed with a concern for Malnutrition and has been determined to have a diagnosis/diagnoses of Severe protein-calorie malnutrition and Underweight (BMI < 19).    This patient is being managed with:   Diet Easy to Chew-  Consistent Carbohydrate {No Snacks} (CSTCHO)  Entered: Nov 13 2022  5:34PM    
This patient has been assessed with a concern for Malnutrition and has been determined to have a diagnosis/diagnoses of Severe protein-calorie malnutrition and Underweight (BMI < 19).    This patient is being managed with:   Diet Easy to Chew-  Consistent Carbohydrate {No Snacks} (CSTCHO)  Entered: Nov 13 2022  5:34PM      This patient has been assessed with a concern for Malnutrition and has been determined to have a diagnosis/diagnoses of Severe protein-calorie malnutrition and Underweight (BMI < 19).    This patient is being managed with:   Diet Easy to Chew-  Consistent Carbohydrate {No Snacks} (CSTCHO)  Entered: Nov 13 2022  5:34PM    
This patient has been assessed with a concern for Malnutrition and has been determined to have a diagnosis/diagnoses of Severe protein-calorie malnutrition and Underweight (BMI < 19).    This patient is being managed with:   Diet Easy to Chew-  Consistent Carbohydrate {No Snacks} (CSTCHO)  Entered: Nov 13 2022  5:34PM    

## 2022-11-22 NOTE — PROGRESS NOTE ADULT - ASSESSMENT
# Acute on chronic severe anemia   # hx liver and spleen mass and lung and abdominal lymphadenopathy MRI abd/chest done in Oct 2022 due to HL  # Coagulopathy , not on blood thinners   no evidence of gross bleed  s/p 2 units PRBC in ED, hb stable   S/P LN biopsy-HL  PET scan neg  -Dr Lopez follow up appreciated- we discussed options such as AVD (likely omit bleomycin given frailty to avoid pulmonary toxicity)+/- brentuximab vs best supportive care and hospice  she wants to take patient home with home services, and wants to try her best to get her to [place]- would recommend PT in am again to evaluate strength for patient - I discussed that this will be difficult given her frail nature, malnutrition with albumin 1 but she is aware and is willing to care for her at home and try given that pts  is in [place] as well - recommend dc home with home hospice services  flow cytometry neg  MRI brain with IC con no masses      # sepsis and dehydration  present on admission  E fecaelis UTI   Completed IV  vanco   ID consult appreciated    #  Acute metabolic encephalopathy /suspect underlying dementia  chronic left upper extremity contracture/paresis  due to radiculopathy  chronic ataxia/imbalanced gait and progressive memory worsening since jan 2022  ? worsening dysphagia over months    sees [facility] neurology as outpatient   MRI brain IV con done 11/9 as outpatient -no masses as per heme  swallow eval- easy to chew    # Failure to thrive severe protein calorie malnutrition   declines po intake   palliative consult for GOC appreciated    #Hx HTN, DM , anemia on iron pills   continue home meds   held lisinopril     # vte prophylaxis   INR near 2    pt is dnr/dni after conv w/ pall care team     Disposition: follow oncology recommendation possibly dc to home / home hospice -  pt was denied placement to [facility] despite peer to peer- discussed with dr merchant of [facility]

## 2022-11-22 NOTE — PROVIDER CONTACT NOTE (OTHER) - SITUATION
Tele hospital  to add patient to list for  visit.  Called in by Eliza
PA informed of low blood sugar reading of 89.
PA informed that Pt's sugar was 105, and she is ordered for 5 units of lantus

## 2022-11-23 ENCOUNTER — TRANSCRIPTION ENCOUNTER (OUTPATIENT)
Age: 77
End: 2022-11-23

## 2022-11-23 VITALS
RESPIRATION RATE: 18 BRPM | DIASTOLIC BLOOD PRESSURE: 60 MMHG | HEART RATE: 80 BPM | SYSTOLIC BLOOD PRESSURE: 122 MMHG | OXYGEN SATURATION: 97 % | TEMPERATURE: 98 F

## 2022-11-23 PROCEDURE — 99232 SBSQ HOSP IP/OBS MODERATE 35: CPT

## 2022-11-23 PROCEDURE — 99239 HOSP IP/OBS DSCHRG MGMT >30: CPT

## 2022-11-23 RX ORDER — LISINOPRIL 2.5 MG/1
1 TABLET ORAL
Qty: 0 | Refills: 0 | DISCHARGE

## 2022-11-23 RX ADMIN — Medication 1: at 12:37

## 2022-11-23 RX ADMIN — Medication 650 MILLIGRAM(S): at 00:44

## 2022-11-23 RX ADMIN — Medication 650 MILLIGRAM(S): at 12:39

## 2022-11-23 NOTE — DISCHARGE NOTE PROVIDER - DETAILS OF MALNUTRITION DIAGNOSIS/DIAGNOSES
This patient has been assessed with a concern for Malnutrition and was treated during this hospitalization for the following Nutrition diagnosis/diagnoses:     -  11/14/2022: Severe protein-calorie malnutrition   -  11/14/2022: Underweight (BMI < 19)

## 2022-11-23 NOTE — DISCHARGE NOTE PROVIDER - NSDCCAREPROVSEEN_GEN_ALL_CORE_FT
Rufina, Jose Angel Manrique, Neeru Mantilla, Anu Carmen, Taylor Lopez, Phani Mendez, Nalini Johnson, Ravi

## 2022-11-23 NOTE — DISCHARGE NOTE PROVIDER - NSDCCPCAREPLAN_GEN_ALL_CORE_FT
PRINCIPAL DISCHARGE DIAGNOSIS  Diagnosis: Anemia  Assessment and Plan of Treatment: Follow-up with your outpatient provider for further monitoring of your blood counts. Monitor for signs/symptoms indicating worsening of disease, such as, easy bleeding/bruising, pale skin, fatigue, dizziness, increased heart rate, or chest pain.   •Hospice is a service that is designed to provide people who are terminally ill and their families with medical, spiritual, and psychological support.  •Hospice aims to improve your quality of life by keeping you as comfortable as possible in the final stages of life.  •Hospice teams often include a doctor, nurse, , counselor, ,dietitian, therapists, and volunteers.  •Hospice care generally includes medicine for symptom management, visits from doctors and nurses, physical and occupational therapy, nutrition counseling, spiritual and emotional counseling, caregiver support, and bereavement support for grieving family members.  •Hospice programs can take place in your home or at a hospice center, hospital, or skilled nursing facility.      SECONDARY DISCHARGE DIAGNOSES  Diagnosis: Sepsis  Assessment and Plan of Treatment:

## 2022-11-23 NOTE — DISCHARGE NOTE PROVIDER - CARE PROVIDER_API CALL
Phani Lopez)  Medicine  [address], [address]  [place] [address]  Phone: [phone]  Fax: [phone]  Follow Up Time:

## 2022-11-23 NOTE — DISCHARGE NOTE NURSING/CASE MANAGEMENT/SOCIAL WORK - PATIENT PORTAL LINK FT
You can access the FollowMyHealth Patient Portal offered by [facility] by registering at the following website: [email or web address]. By joining [facility]’s FollowMyHealth portal, you will also be able to view your health information using other applications (apps) compatible with our system.

## 2022-11-23 NOTE — PROGRESS NOTE ADULT - SUBJECTIVE AND OBJECTIVE BOX
78 y/o F with pmhx IDDM , HTN, anemia , liver and splenic mass with lung and intrabdominal lymphadenopathy , chronic left upper extremity paresis/contracture seen by Dr ray neurosurgery sept 2021 and found to have spinal nerve compression at that time - spinal fusion recommended but patient an daughter declined surgery .patient    patient was brought in by daughter from home for fever 101 at home day PTA and 2 days of lethargy , decreased po intake, dark urine , for the past two days , has not been taking her meds, she spits out her meds  Patient is under care of [facility] Dr Lopez since march 2022 for anemia - , had MRI abd/chest in Oct which showed liver and splenic mass with lung and intra-abdominal lymphadenopathy , on 11/9/22 had PET scan outpt -plan was to follow up with Dr lopez last week for arranging tissue biopsy however missed appt.    Also since Jan 2022 patient is under care of [facility] neurology for worsening mentation and abnormal gait, balance issues  and issues with speech as per daughter  she was supposed to have EEG last month which was missed , patient had MRI brain with IV contrast at [facility] outpt  11/9/22- result unknown    in ED fever 101, RSV positive received 30cc/kg IVF bolus, received 2 units PRBC for hb 5.8       11/15 pt drowsy, but arousable , follows 1 step commands, but seems confused , responds to her name, does not know place , no po intake , denies CP, SOB, abd pain,  11/16/22: Sitting in chair, confused.   11/17/22: LN biopsy today, no new issues, Discussed with daughter yesterday in length regarding management plan  11/18/22: Sitting in chair, no new issues.   11/19/22: Patient seen and examined. FS low this am  11/20/22: Patient seen and examined. Pleasantly confused. No new issues. Blood sugar better today  11/21/22: Patient seen and examined.  Biopsy C/W Hodgkin's lymphoma. discussed with Dr Lopez. He will discuss with the daughter regarding inpatient chemo therapy due to on going high fever      HPIAll other review of systems negative, except as noted in HPI        Vital Signs Last 24 Hrs  T(C): 37.4 (21 Nov 2022 10:43), Max: 39.4 (21 Nov 2022 08:02)  T(F): 99.4 (21 Nov 2022 10:43), Max: 103 (21 Nov 2022 08:02)  HR: 106 (21 Nov 2022 08:35) (79 - 108)  BP: 132/42 (21 Nov 2022 08:35) (120/37 - 136/56)  BP(mean): --  RR: 16 (21 Nov 2022 08:35) (16 - 18)  SpO2: 93% (21 Nov 2022 08:35) (93% - 97%)    Parameters below as of 21 Nov 2022 08:35  Patient On (Oxygen Delivery Method): room air        Physical Exam:     Constitutional: NAD  HEENT: Atraumatic, PHILIPPE, Normal, No congestion  Respiratory: Breath Sounds normal, no rhonchi/wheeze  Cardiovascular: N S1S2;   Gastrointestinal: Abdomen soft, non tender, Bowel Ssounds present  Extremities: No edema,   Neurological: more drowsy today but arousable, sitting in chair, confused , oriented to self , chronic left upper extremity contracture/paresis , rest of the extremities 4/5 motor , follows 1 step commands , no gross facial asymmetry  Skin: Non cellulitic, no rash, ulcers              MEDICATIONS  (STANDING):  atenolol  Tablet 25 milliGRAM(s) Oral daily  dextrose 5%. 1000 milliLiter(s) (100 mL/Hr) IV Continuous <Continuous>  dextrose 5%. 1000 milliLiter(s) (50 mL/Hr) IV Continuous <Continuous>  dextrose 50% Injectable 25 Gram(s) IV Push once  dextrose 50% Injectable 12.5 Gram(s) IV Push once  dextrose 50% Injectable 25 Gram(s) IV Push once  ferrous    sulfate 325 milliGRAM(s) Oral daily  glucagon  Injectable 1 milliGRAM(s) IntraMuscular once  insulin glargine Injectable (LANTUS) 5 Unit(s) SubCutaneous at bedtime  insulin lispro (ADMELOG) corrective regimen sliding scale   SubCutaneous three times a day before meals  insulin lispro (ADMELOG) corrective regimen sliding scale   SubCutaneous at bedtime  sodium chloride 0.45%. 1000 milliLiter(s) (50 mL/Hr) IV Continuous <Continuous>  sodium chloride 0.9%. 1000 milliLiter(s) (60 mL/Hr) IV Continuous <Continuous>  vancomycin  IVPB 750 milliGRAM(s) IV Intermittent every 12 hours          
Date of service: 22 @ 11:52    pt seen and examined  sitting up in chair  has cough and congestion  no resp distress  has urinary incontinence     ROS: no fever or chills; denies dizziness, no HA, + SOB + cough, no abdominal pain, no diarrhea or constipation; no legs pain, no rashes    MEDICATIONS  (STANDING):  atenolol  Tablet 25 milliGRAM(s) Oral daily  dextrose 5%. 1000 milliLiter(s) (100 mL/Hr) IV Continuous <Continuous>  dextrose 5%. 1000 milliLiter(s) (50 mL/Hr) IV Continuous <Continuous>  dextrose 50% Injectable 25 Gram(s) IV Push once  dextrose 50% Injectable 12.5 Gram(s) IV Push once  dextrose 50% Injectable 25 Gram(s) IV Push once  ferrous    sulfate 325 milliGRAM(s) Oral daily  glucagon  Injectable 1 milliGRAM(s) IntraMuscular once  insulin glargine Injectable (LANTUS) 5 Unit(s) SubCutaneous at bedtime  insulin lispro (ADMELOG) corrective regimen sliding scale   SubCutaneous three times a day before meals  insulin lispro (ADMELOG) corrective regimen sliding scale   SubCutaneous at bedtime  sodium chloride 0.45%. 1000 milliLiter(s) (50 mL/Hr) IV Continuous <Continuous>  sodium chloride 0.9% with potassium chloride 20 mEq/L 1000 milliLiter(s) (60 mL/Hr) IV Continuous <Continuous>  vancomycin  IVPB 750 milliGRAM(s) IV Intermittent every 12 hours    Vital Signs Last 24 Hrs  T(C): 37.9 (2022 08:03), Max: 38.2 (2022 21:40)  T(F): 100.2 (2022 08:03), Max: 100.8 (2022 21:40)  HR: 95 (2022 08:03) (89 - 105)  BP: 136/86 (2022 11:49) (108/53 - 149/63)  BP(mean): 85 (2022 21:40) (85 - 85)  RR: 18 (2022 21:40) (17 - 18)  SpO2: 100% (2022 08:03) (96% - 100%)    Parameters below as of 2022 08:03  Patient On (Oxygen Delivery Method): room air    PE:  Constitutional: frail looking  HEENT: NC/AT, EOMI, PERRLA, conjunctivae clear; ears and nose atraumatic; pharynx benign  Neck: supple; thyroid not palpable  Back: no tenderness  Respiratory: decreased breath sounds   Cardiovascular: S1S2 regular, no murmurs  Abdomen: soft, not tender, not distended, positive BS; liver and spleen WNL  Genitourinary: no suprapubic tenderness  Lymphatic: no LN palpable  Musculoskeletal: no muscle tenderness, no joint swelling or tenderness  Extremities: no pedal edema  Neurological/ Psychiatric: AxOx3, Judgement and insight normal;  moving all extremities  Skin: no rashes; no palpable lesions    Labs: all available labs reviewed                                   7.9    7.60  )-----------( 191      ( 2022 08:44 )             23.4         136  |  107  |  12  ----------------------------<  65<L>  4.0   |  27  |  0.46<L>    Ca    8.8      2022 08:44        Urinalysis Basic - ( 2022 22:55 )    Color: Yellow / Appearance: Clear / S.015 / pH: x  Gluc: x / Ketone: Trace  / Bili: Negative / Urobili: 8   Blood: x / Protein: 30 mg/dL / Nitrite: Negative   Leuk Esterase: Trace / RBC: 0-2 /HPF / WBC 3-5   Sq Epi: x / Non Sq Epi: Occasional / Bacteria: TNTC    Culture - Blood (22 @ 08:26)   Specimen Source: .Blood None   Culture Results:   No growth to date. Culture - Blood (22 @ 08:26)   Specimen Source: .Blood None   Culture Results:   No growth to date. Culture - Urine (22 @ 22:55)   - Ampicillin: S <=2 Predicts results to ampicillin/sulbactam, amoxacillin-clavulanate and piperacillin-tazobactam.   - Ciprofloxacin: S <=1   - Levofloxacin: S 2   - Nitrofurantoin: S <=32 Should not be used to treat pyelonephritis.   - Tetracycline: S <=4   - Vancomycin: S 2   Specimen Source: Clean Catch Clean Catch (Midstream)   Culture Results:   >100,000 CFU/ml Enterococcus faecalis   Organism Identification: Enterococcus faecalis   Organism: Enterococcus faecalis   Method Type: JERRICA       Radiology: all available radiological tests reviewed    Advanced directives addressed: full resuscitation
INTERVAL HPI/OVERNIGHT EVENTS:  Patient S&E at bedside.   Pt remains febrile, received tylenol  eating in bed with assistance today   discussed with daughter yesterday and will try for dc today with home hospice services    PAST MEDICAL & SURGICAL HISTORY:  DM (diabetes mellitus)          FAMILY HISTORY:      VITAL SIGNS:  T(F): 100.6 (11-22-22 @ 08:02)  HR: 95 (11-22-22 @ 08:02)  BP: 137/80 (11-22-22 @ 08:02)  RR: 18 (11-22-22 @ 08:02)  SpO2: 95% (11-22-22 @ 08:02)  Wt(kg): --    PHYSICAL EXAM:  Constitutional: frail, confused  Eyes: EOMI,   Respiratory: rhonchi, lot of phlegm being kept in motuh   Cardiovascular: RRR,   Gastrointestinal: soft, NTND,  Extremities: no c/c/e  Neurological: AAOx1      MEDICATIONS  (STANDING):  atenolol  Tablet 25 milliGRAM(s) Oral daily  dextrose 5%. 1000 milliLiter(s) (50 mL/Hr) IV Continuous <Continuous>  dextrose 5%. 1000 milliLiter(s) (100 mL/Hr) IV Continuous <Continuous>  dextrose 50% Injectable 25 Gram(s) IV Push once  dextrose 50% Injectable 12.5 Gram(s) IV Push once  dextrose 50% Injectable 25 Gram(s) IV Push once  ferrous    sulfate 325 milliGRAM(s) Oral daily  glucagon  Injectable 1 milliGRAM(s) IntraMuscular once  insulin glargine Injectable (LANTUS) 5 Unit(s) SubCutaneous at bedtime  insulin lispro (ADMELOG) corrective regimen sliding scale   SubCutaneous three times a day before meals  insulin lispro (ADMELOG) corrective regimen sliding scale   SubCutaneous at bedtime    MEDICATIONS  (PRN):  acetaminophen     Tablet .. 650 milliGRAM(s) Oral every 6 hours PRN Temp greater or equal to 38C (100.4F), Mild Pain (1 - 3)  acetaminophen  Suppository .. 650 milliGRAM(s) Rectal every 6 hours PRN Temp greater or equal to 38C (100.4F)  aluminum hydroxide/magnesium hydroxide/simethicone Suspension 30 milliLiter(s) Oral every 4 hours PRN Dyspepsia  dextrose Oral Gel 15 Gram(s) Oral once PRN Blood Glucose LESS THAN 70 milliGRAM(s)/deciliter  guaiFENesin Oral Liquid (Sugar-Free) 100 milliGRAM(s) Oral every 6 hours PRN Cough  melatonin 3 milliGRAM(s) Oral at bedtime PRN Insomnia  ondansetron Injectable 4 milliGRAM(s) IV Push every 8 hours PRN Nausea and/or Vomiting      Allergies    penicillins (Unknown)    Intolerances        LABS:                        8.0    8.35  )-----------( 216      ( 21 Nov 2022 08:50 )             24.1     11-21    138  |  103  |  15  ----------------------------<  206<H>  3.9   |  31  |  0.63    Ca    9.4      21 Nov 2022 08:50    TPro  6.4  /  Alb  1.0<L>  /  TBili  1.0  /  DBili  x   /  AST  96<H>  /  ALT  43  /  AlkPhos  494<H>  11-21          RADIOLOGY & ADDITIONAL TESTS:  Studies reviewed.  
INTERVAL HPI/OVERNIGHT EVENTS:  Patient S&E at bedside.   pt continues to spike fever 103, 102.2 and given tylenol, 93% on RA   Labs reviewed today, WBC 8.35, Hb 8, plt 216, alc 0.81  discussed case with pathology and preliminary read is HODGKINS LYMPHOMA     PAST MEDICAL & SURGICAL HISTORY:  DM (diabetes mellitus)          FAMILY HISTORY:      VITAL SIGNS:  T(F): 102.2 (11-21-22 @ 08:35)  HR: 106 (11-21-22 @ 08:35)  BP: 132/42 (11-21-22 @ 08:35)  RR: 16 (11-21-22 @ 08:35)  SpO2: 93% (11-21-22 @ 08:35)  Wt(kg): --    PHYSICAL EXAM:    Constitutional: frail, confused  Eyes: EOMI,   Respiratory: rhonchi, lot of phlegm being kept in motuh   Cardiovascular: RRR,   Gastrointestinal: soft, NTND,  Extremities: no c/c/e  Neurological: AAOx1      MEDICATIONS  (STANDING):  atenolol  Tablet 25 milliGRAM(s) Oral daily  dextrose 5%. 1000 milliLiter(s) (50 mL/Hr) IV Continuous <Continuous>  dextrose 5%. 1000 milliLiter(s) (100 mL/Hr) IV Continuous <Continuous>  dextrose 50% Injectable 25 Gram(s) IV Push once  dextrose 50% Injectable 12.5 Gram(s) IV Push once  dextrose 50% Injectable 25 Gram(s) IV Push once  ferrous    sulfate 325 milliGRAM(s) Oral daily  glucagon  Injectable 1 milliGRAM(s) IntraMuscular once  insulin glargine Injectable (LANTUS) 5 Unit(s) SubCutaneous at bedtime  insulin lispro (ADMELOG) corrective regimen sliding scale   SubCutaneous three times a day before meals  insulin lispro (ADMELOG) corrective regimen sliding scale   SubCutaneous at bedtime    MEDICATIONS  (PRN):  acetaminophen     Tablet .. 650 milliGRAM(s) Oral every 6 hours PRN Temp greater or equal to 38C (100.4F), Mild Pain (1 - 3)  acetaminophen  Suppository .. 650 milliGRAM(s) Rectal every 6 hours PRN Temp greater or equal to 38C (100.4F)  aluminum hydroxide/magnesium hydroxide/simethicone Suspension 30 milliLiter(s) Oral every 4 hours PRN Dyspepsia  dextrose Oral Gel 15 Gram(s) Oral once PRN Blood Glucose LESS THAN 70 milliGRAM(s)/deciliter  guaiFENesin Oral Liquid (Sugar-Free) 100 milliGRAM(s) Oral every 6 hours PRN Cough  melatonin 3 milliGRAM(s) Oral at bedtime PRN Insomnia  ondansetron Injectable 4 milliGRAM(s) IV Push every 8 hours PRN Nausea and/or Vomiting      Allergies    penicillins (Unknown)    Intolerances        LABS:                        8.0    8.35  )-----------( 216      ( 21 Nov 2022 08:50 )             24.1                 RADIOLOGY & ADDITIONAL TESTS:  Studies reviewed.  
INTERVAL HPI/OVERNIGHT EVENTS:  Patient S&E at bedside. No o/n events,     PAST MEDICAL & SURGICAL HISTORY:  DM (diabetes mellitus)          FAMILY HISTORY:      VITAL SIGNS:  T(F): 100.4 (11-16-22 @ 06:55)  HR: 82 (11-15-22 @ 22:54)  BP: 137/51 (11-15-22 @ 22:54)  RR: 16 (11-15-22 @ 22:54)  SpO2: 100% (11-15-22 @ 22:54)  Wt(kg): --    PHYSICAL EXAM:    Constitutional: NAD, lethargy, confused  Eyes: EOMI, sclera non-icteric  Respiratory: CTA b/l,  Cardiovascular: RRR,   Gastrointestinal: mild tenderness in RUQ abdomen   Extremities: no c/c/e  Neurological: AAOx2      MEDICATIONS  (STANDING):  atenolol  Tablet 25 milliGRAM(s) Oral daily  dextrose 5%. 1000 milliLiter(s) (100 mL/Hr) IV Continuous <Continuous>  dextrose 5%. 1000 milliLiter(s) (50 mL/Hr) IV Continuous <Continuous>  dextrose 50% Injectable 25 Gram(s) IV Push once  dextrose 50% Injectable 12.5 Gram(s) IV Push once  dextrose 50% Injectable 25 Gram(s) IV Push once  ferrous    sulfate 325 milliGRAM(s) Oral daily  glucagon  Injectable 1 milliGRAM(s) IntraMuscular once  insulin glargine Injectable (LANTUS) 5 Unit(s) SubCutaneous at bedtime  insulin lispro (ADMELOG) corrective regimen sliding scale   SubCutaneous three times a day before meals  insulin lispro (ADMELOG) corrective regimen sliding scale   SubCutaneous at bedtime  potassium chloride   Powder 40 milliEquivalent(s) Oral once  sodium chloride 0.45%. 1000 milliLiter(s) (50 mL/Hr) IV Continuous <Continuous>  sodium chloride 0.9% with potassium chloride 20 mEq/L 1000 milliLiter(s) (60 mL/Hr) IV Continuous <Continuous>  vancomycin  IVPB 750 milliGRAM(s) IV Intermittent every 12 hours    MEDICATIONS  (PRN):  acetaminophen     Tablet .. 650 milliGRAM(s) Oral every 6 hours PRN Temp greater or equal to 38C (100.4F), Mild Pain (1 - 3)  acetaminophen  Suppository .. 650 milliGRAM(s) Rectal every 6 hours PRN Temp greater or equal to 38C (100.4F)  aluminum hydroxide/magnesium hydroxide/simethicone Suspension 30 milliLiter(s) Oral every 4 hours PRN Dyspepsia  dextrose Oral Gel 15 Gram(s) Oral once PRN Blood Glucose LESS THAN 70 milliGRAM(s)/deciliter  melatonin 3 milliGRAM(s) Oral at bedtime PRN Insomnia  ondansetron Injectable 4 milliGRAM(s) IV Push every 8 hours PRN Nausea and/or Vomiting      Allergies    penicillins (Unknown)    Intolerances        LABS:                        8.9    9.06  )-----------( 226      ( 15 Nov 2022 08:53 )             26.3     11-15    141  |  108  |  17  ----------------------------<  137<H>  3.3<L>   |  28  |  0.60    Ca    9.4      15 Nov 2022 08:53    TPro  6.7  /  Alb  1.2<L>  /  TBili  0.7  /  DBili  x   /  AST  36  /  ALT  16  /  AlkPhos  294<H>  11-15    PT/INR - ( 14 Nov 2022 09:50 )   PT: 25.8 sec;   INR: 2.21 ratio               RADIOLOGY & ADDITIONAL TESTS:  Studies reviewed.  
76 y/o F with pmhx IDDM , HTN, anemia , liver and splenic mass with lung and intrabdominal lymphadenopathy , chronic left upper extremity paresis/contracture seen by Dr ray neurosurgery 2021 and found to have spinal nerve compression at that time - spinal fusion recommended but patient an daughter declined surgery .patient    patient was brought in by daughter from home for fever 101 at home day PTA and 2 days of lethargy , decreased po intake, dark urine , for the past two days , has not been taking her meds, she spits out her meds    Patient is under care of [facility] Dr Lopez since 2022 for anemia - , had MRI abd/chest in Oct which showed liver and splenic mass with lung and intra-abdominal lymphadenopathy , on 22 had PET scan outpt - plan was to follow up with Dr lopez last week for arranging tissue biopsy however missed appt.    Also since 2022 patient is under care of [facility] neurology for worsening mentation and abnormal gait, balance issues  and issues with speech as per daughter  she was supposed to have EEG last month which was missed , patient had MRI brain with IV contrast at [facility] outpt  22- result unknown    in ED fever 101, RSV positive received 30cc/kg IVF bolus, received 2 units PRBC for hb 5.8        pt awake, follows 1 step commands, but seems confused , responds to her name, does not know place , says she feel  better today, denies CP, SOB, abd pain, decreased po intake       HPIAll other review of systems negative, except as noted in HPI          Constitutional:NAD  HEENT: Atraumatic, PHILIPPE, Normal, No congestion  Respiratory: Breath Sounds normal, no rhonchi/wheeze  Cardiovascular: N S1S2;   Gastrointestinal: Abdomen soft, non tender, Bowel Ssounds present  Extremities: No edema,   Neurological: awake, confused , oriented to self , chronic left upper extremity contracture/paresis , rest of the extremities 4/5 motor , follows 1 step commands , no gross facial asymmetry  Skin: Non cellulitic, no rash, ulcers      PHYSICAL EXAM:    Daily     Daily     ICU Vital Signs Last 24 Hrs  T(C): 36.8 (2022 15:53), Max: 39.3 (2022 18:00)  T(F): 98.3 (2022 15:53), Max: 102.7 (2022 18:00)  HR: 96 (2022 15:53) (89 - 102)  BP: 147/67 (2022 15:53) (106/42 - 147/67)  BP(mean): 58 (2022 22:02) (58 - 58)  ABP: --  ABP(mean): --  RR: 18 (2022 15:53) (18 - 18)  SpO2: 97% (2022 15:53) (95% - 99%)    O2 Parameters below as of 2022 15:53  Patient On (Oxygen Delivery Method): room air                                8.6    10.54 )-----------( 225      ( 2022 09:50 )             26.0       CBC Full  -  ( 2022 09:50 )  WBC Count : 10.54 K/uL  RBC Count : 3.89 M/uL  Hemoglobin : 8.6 g/dL  Hematocrit : 26.0 %  Platelet Count - Automated : 225 K/uL  Mean Cell Volume : 66.8 fl  Mean Cell Hemoglobin : 22.1 pg  Mean Cell Hemoglobin Concentration : 33.1 gm/dL  Auto Neutrophil # : 7.68 K/uL  Auto Lymphocyte # : 1.04 K/uL  Auto Monocyte # : 1.58 K/uL  Auto Eosinophil # : 0.00 K/uL  Auto Basophil # : 0.02 K/uL  Auto Neutrophil % : 72.8 %  Auto Lymphocyte % : 9.9 %  Auto Monocyte % : 15.0 %  Auto Eosinophil % : 0.0 %  Auto Basophil % : 0.2 %          142  |  110<H>  |  19  ----------------------------<  157<H>  3.8   |  24  |  0.62    Ca    9.4      2022 09:50    TPro  7.0  /  Alb  1.2<L>  /  TBili  0.8  /  DBili  x   /  AST  30  /  ALT  11<L>  /  AlkPhos  216<H>  14      LIVER FUNCTIONS - ( 2022 09:50 )  Alb: 1.2 g/dL / Pro: 7.0 gm/dL / ALK PHOS: 216 U/L / ALT: 11 U/L / AST: 30 U/L / GGT: x             PT/INR - ( 2022 09:50 )   PT: 25.8 sec;   INR: 2.21 ratio         PTT - ( 2022 10:07 )  PTT:32.6 sec          Urinalysis Basic - ( 2022 22:55 )    Color: Yellow / Appearance: Clear / S.015 / pH: x  Gluc: x / Ketone: Trace  / Bili: Negative / Urobili: 8   Blood: x / Protein: 30 mg/dL / Nitrite: Negative   Leuk Esterase: Trace / RBC: 0-2 /HPF / WBC 3-5   Sq Epi: x / Non Sq Epi: Occasional / Bacteria: TNTC            MEDICATIONS  (STANDING):  atenolol  Tablet 25 milliGRAM(s) Oral daily  dextrose 5%. 1000 milliLiter(s) (100 mL/Hr) IV Continuous <Continuous>  dextrose 5%. 1000 milliLiter(s) (50 mL/Hr) IV Continuous <Continuous>  dextrose 50% Injectable 25 Gram(s) IV Push once  dextrose 50% Injectable 12.5 Gram(s) IV Push once  dextrose 50% Injectable 25 Gram(s) IV Push once  ferrous    sulfate 325 milliGRAM(s) Oral daily  glucagon  Injectable 1 milliGRAM(s) IntraMuscular once  insulin glargine Injectable (LANTUS) 5 Unit(s) SubCutaneous at bedtime  insulin lispro (ADMELOG) corrective regimen sliding scale   SubCutaneous three times a day before meals  insulin lispro (ADMELOG) corrective regimen sliding scale   SubCutaneous at bedtime  sodium chloride 0.45%. 1000 milliLiter(s) (50 mL/Hr) IV Continuous <Continuous>  sodium chloride 0.9%. 1000 milliLiter(s) (50 mL/Hr) IV Continuous <Continuous>  vancomycin  IVPB 750 milliGRAM(s) IV Intermittent every 12 hours      
76 y/o F with pmhx IDDM , HTN, anemia , liver and splenic mass with lung and intrabdominal lymphadenopathy , chronic left upper extremity paresis/contracture seen by Dr ray neurosurgery sept 2021 and found to have spinal nerve compression at that time - spinal fusion recommended but patient an daughter declined surgery .patient    patient was brought in by daughter from home for fever 101 at home day PTA and 2 days of lethargy , decreased po intake, dark urine , for the past two days , has not been taking her meds, she spits out her meds  Patient is under care of [facility] Dr Lopez since march 2022 for anemia - , had MRI abd/chest in Oct which showed liver and splenic mass with lung and intra-abdominal lymphadenopathy , on 11/9/22 had PET scan outpt -plan was to follow up with Dr lopez last week for arranging tissue biopsy however missed appt.    Also since Jan 2022 patient is under care of [facility] neurology for worsening mentation and abnormal gait, balance issues  and issues with speech as per daughter  she was supposed to have EEG last month which was missed , patient had MRI brain with IV contrast at [facility] outpt  11/9/22- result unknown    in ED fever 101, RSV positive received 30cc/kg IVF bolus, received 2 units PRBC for hb 5.8       11/15 pt drowsy, but arousable , follows 1 step commands, but seems confused , responds to her name, does not know place , no po intake , denies CP, SOB, abd pain,  11/16/22: Sitting in chair, confused.       HPIAll other review of systems negative, except as noted in HPI        Vital Signs Last 24 Hrs  T(C): 37.6 (16 Nov 2022 07:46), Max: 39.3 (16 Nov 2022 05:37)  T(F): 99.6 (16 Nov 2022 07:46), Max: 102.7 (16 Nov 2022 05:37)  HR: 101 (16 Nov 2022 07:46) (82 - 101)  BP: 125/42 (16 Nov 2022 07:46) (117/44 - 137/51)  BP(mean): 71 (15 Nov 2022 22:54) (71 - 71)  RR: 18 (16 Nov 2022 07:46) (16 - 18)  SpO2: 96% (16 Nov 2022 07:46) (96% - 100%)    Parameters below as of 16 Nov 2022 07:46  Patient On (Oxygen Delivery Method): room air      Physical Exam:     Constitutional: NAD  HEENT: Atraumatic, PHILIPPE, Normal, No congestion  Respiratory: Breath Sounds normal, no rhonchi/wheeze  Cardiovascular: N S1S2;   Gastrointestinal: Abdomen soft, non tender, Bowel Ssounds present  Extremities: No edema,   Neurological: more drowsy today but arousable, sitting in chair, confused , oriented to self , chronic left upper extremity contracture/paresis , rest of the extremities 4/5 motor , follows 1 step commands , no gross facial asymmetry  Skin: Non cellulitic, no rash, ulcers                                           7.9    6.94  )-----------( 212      ( 16 Nov 2022 08:26 )             24.0     16 Nov 2022 08:26    141    |  110    |  14     ----------------------------<  115    3.5     |  26     |  0.49     Ca    8.9        16 Nov 2022 08:26    TPro  6.1    /  Alb  1.1    /  TBili  0.8    /  DBili  x      /  AST  43     /  ALT  18     /  AlkPhos  255    16 Nov 2022 08:26    LIVER FUNCTIONS - ( 16 Nov 2022 08:26 )  Alb: 1.1 g/dL / Pro: 6.1 gm/dL / ALK PHOS: 255 U/L / ALT: 18 U/L / AST: 43 U/L / GGT: x           PT/INR - ( 16 Nov 2022 08:26 )   PT: 22.6 sec;   INR: 1.94 ratio         PTT - ( 16 Nov 2022 08:26 )  PTT:30.2 sec  CAPILLARY BLOOD GLUCOSE      POCT Blood Glucose.: 221 mg/dL (16 Nov 2022 11:50)  POCT Blood Glucose.: 122 mg/dL (16 Nov 2022 07:52)  POCT Blood Glucose.: 116 mg/dL (15 Nov 2022 21:18)  POCT Blood Glucose.: 195 mg/dL (15 Nov 2022 16:13)          MEDICATIONS  (STANDING):  atenolol  Tablet 25 milliGRAM(s) Oral daily  dextrose 5%. 1000 milliLiter(s) (100 mL/Hr) IV Continuous <Continuous>  dextrose 5%. 1000 milliLiter(s) (50 mL/Hr) IV Continuous <Continuous>  dextrose 50% Injectable 25 Gram(s) IV Push once  dextrose 50% Injectable 12.5 Gram(s) IV Push once  dextrose 50% Injectable 25 Gram(s) IV Push once  ferrous    sulfate 325 milliGRAM(s) Oral daily  glucagon  Injectable 1 milliGRAM(s) IntraMuscular once  insulin glargine Injectable (LANTUS) 5 Unit(s) SubCutaneous at bedtime  insulin lispro (ADMELOG) corrective regimen sliding scale   SubCutaneous three times a day before meals  insulin lispro (ADMELOG) corrective regimen sliding scale   SubCutaneous at bedtime  sodium chloride 0.45%. 1000 milliLiter(s) (50 mL/Hr) IV Continuous <Continuous>  sodium chloride 0.9%. 1000 milliLiter(s) (60 mL/Hr) IV Continuous <Continuous>  vancomycin  IVPB 750 milliGRAM(s) IV Intermittent every 12 hours          
HPI: Pt is a 77y old Female with hx of IDDM , HTN, anemia , liver and splenic mass with lung and intrabdominal lymphadenopathy , chronic left upper extremity paresis/contracture seen by Dr ray neurosurgery sept 2021 and found to have spinal nerve compression at that time - spinal fusion recommended but patient an daughter declined surgery .patient patient was brought in by daughter from home for fever 101 at home day PTA and 2 days of lethargy , decreased po intake, dark urine , for the past two days , has not been taking her meds, she spits out her meds  Patient is under care of heme onc Dr Lopez since march 2022 for anemia - , had MRI abd/chest in Oct which showed liver and splenic mass with lung and intra-abdominal lymphadenopathy , on 11/9/22 had PET scan outpt -plan was to follow up with Dr lopez last week for arranging tissue biopsy however missed appt. Palliative medicine consulted to help establish GOC and advance care planning     11/16/2022 pt seen and examined with no family at bedside, patient appears comfortable and was happily smiling in bed, but was unable to answer any questions followed simple commands, GOC meeting scheduled for 11/17/2022 at 1PM   11/17/22 Seen and  examined at bedside. Unresponsive to verbal stimuli.     PAIN: ( )Yes   (x )No  DYSPNEA: ( ) Yes  (x ) No      PAST MEDICAL & SURGICAL HISTORY:  DM (diabetes mellitus)  SOCIAL HX: lives at home   Hx opiate tolerance ( )YES  ( )NO    Baseline ADLs  (Prior to Admission)  ( ) Independent   (x )Dependent    FAMILY HISTORY:      Review of Systems:    Unable to obtain/Limited due to: AMS       PHYSICAL EXAM:  ICU Vital Signs Last 24 Hrs  T(C): 37.2 (17 Nov 2022 12:13), Max: 39.4 (16 Nov 2022 18:00)  T(F): 99 (17 Nov 2022 12:13), Max: 102.9 (16 Nov 2022 18:00)  HR: 99 (17 Nov 2022 12:13) (89 - 99)  BP: 108/53 (17 Nov 2022 12:13) (103/47 - 144/59)  BP(mean): 68 (16 Nov 2022 22:11) (68 - 68)  RR: 17 (17 Nov 2022 12:13) (16 - 18)  SpO2: 96% (17 Nov 2022 12:13) (94% - 99%)    O2 Parameters below as of 17 Nov 2022 12:13  Patient On (Oxygen Delivery Method): room air      PPSV2: 30  %  FAST: unsure of baseline - recently worsening cognitive decline     General: elderly female in bed  Mental Status: minimally responsive   HEENT: mmm + temporal wasting   Lungs: diminished breath sounds b/l   Cardiac: s1s2 +   GI: nontender, nondistended, +BS   : no suprapubic tenderness   Ext: no edema   Neuro: weakness     LABS:                                 7.9    7.60  )-----------( 191      ( 17 Nov 2022 08:44 )             23.4          11-17    136  |  107  |  12  ----------------------------<  65<L>  4.0   |  27  |  0.46<L>    Ca    8.8      17 Nov 2022 08:44    TPro  6.1  /  Alb  1.1<L>  /  TBili  0.8  /  DBili  x   /  AST  43<H>  /  ALT  18  /  AlkPhos  255<H>  11-16     PTT - ( 16 Nov 2022 08:26 )  PTT:30.2 sec  Albumin: Albumin, Serum: 1.1 g/dL (11-16 @ 08:26)      Allergies    penicillins (Unknown)    Intolerances      MEDICATIONS  (STANDING):  atenolol  Tablet 25 milliGRAM(s) Oral daily  dextrose 5%. 1000 milliLiter(s) (100 mL/Hr) IV Continuous <Continuous>  dextrose 5%. 1000 milliLiter(s) (50 mL/Hr) IV Continuous <Continuous>  dextrose 50% Injectable 25 Gram(s) IV Push once  dextrose 50% Injectable 12.5 Gram(s) IV Push once  dextrose 50% Injectable 25 Gram(s) IV Push once  ferrous    sulfate 325 milliGRAM(s) Oral daily  glucagon  Injectable 1 milliGRAM(s) IntraMuscular once  insulin glargine Injectable (LANTUS) 5 Unit(s) SubCutaneous at bedtime  insulin lispro (ADMELOG) corrective regimen sliding scale   SubCutaneous three times a day before meals  insulin lispro (ADMELOG) corrective regimen sliding scale   SubCutaneous at bedtime  sodium chloride 0.45%. 1000 milliLiter(s) (50 mL/Hr) IV Continuous <Continuous>  sodium chloride 0.9% with potassium chloride 20 mEq/L 1000 milliLiter(s) (60 mL/Hr) IV Continuous <Continuous>  vancomycin  IVPB 750 milliGRAM(s) IV Intermittent every 12 hours    MEDICATIONS  (PRN):  acetaminophen     Tablet .. 650 milliGRAM(s) Oral every 6 hours PRN Temp greater or equal to 38C (100.4F), Mild Pain (1 - 3)  acetaminophen  Suppository .. 650 milliGRAM(s) Rectal every 6 hours PRN Temp greater or equal to 38C (100.4F)  aluminum hydroxide/magnesium hydroxide/simethicone Suspension 30 milliLiter(s) Oral every 4 hours PRN Dyspepsia  dextrose Oral Gel 15 Gram(s) Oral once PRN Blood Glucose LESS THAN 70 milliGRAM(s)/deciliter  melatonin 3 milliGRAM(s) Oral at bedtime PRN Insomnia  ondansetron Injectable 4 milliGRAM(s) IV Push every 8 hours PRN Nausea and/or Vomiting      RADIOLOGY/ADDITIONAL STUDIES:          
78 y/o F with pmhx IDDM , HTN, anemia , liver and splenic mass with lung and intrabdominal lymphadenopathy , chronic left upper extremity paresis/contracture seen by Dr ray neurosurgery sept 2021 and found to have spinal nerve compression at that time - spinal fusion recommended but patient an daughter declined surgery .patient    patient was brought in by daughter from home for fever 101 at home day PTA and 2 days of lethargy , decreased po intake, dark urine , for the past two days , has not been taking her meds, she spits out her meds  Patient is under care of [facility] Dr Lopez since march 2022 for anemia - , had MRI abd/chest in Oct which showed liver and splenic mass with lung and intra-abdominal lymphadenopathy , on 11/9/22 had PET scan outpt -plan was to follow up with Dr lopez last week for arranging tissue biopsy however missed appt.    Also since Jan 2022 patient is under care of [facility] neurology for worsening mentation and abnormal gait, balance issues  and issues with speech as per daughter  she was supposed to have EEG last month which was missed , patient had MRI brain with IV contrast at [facility] outpt  11/9/22- result unknown    in ED fever 101, RSV positive received 30cc/kg IVF bolus, received 2 units PRBC for hb 5.8       11/15 pt drowsy, but arousable , follows 1 step commands, but seems confused , responds to her name, does not know place , no po intake , denies CP, SOB, abd pain,  11/16/22: Sitting in chair, confused.   11/17/22: LN biopsy today, no new issues, Discussed with daughter yesterday in length regarding management plan  11/18/22: Sitting in chair, no new issues.   11/19/22: Patient seen and examined. FS low this am  11/20/22: Patient seen and examined. Pleasantly confused. No new issues. Blood sugar better today      HPIAll other review of systems negative, except as noted in HPI        Vital Signs Last 24 Hrs  T(C): 37.6 (20 Nov 2022 07:53), Max: 38.9 (19 Nov 2022 15:57)  T(F): 99.6 (20 Nov 2022 07:53), Max: 102 (19 Nov 2022 15:57)  HR: 92 (20 Nov 2022 07:53) (87 - 98)  BP: 135/42 (20 Nov 2022 07:53) (135/42 - 142/66)  BP(mean): --  RR: 18 (20 Nov 2022 07:53) (18 - 18)  SpO2: 92% (20 Nov 2022 07:53) (92% - 98%)    Parameters below as of 20 Nov 2022 07:53  Patient On (Oxygen Delivery Method): room air        Physical Exam:     Constitutional: NAD  HEENT: Atraumatic, PHILIPPE, Normal, No congestion  Respiratory: Breath Sounds normal, no rhonchi/wheeze  Cardiovascular: N S1S2;   Gastrointestinal: Abdomen soft, non tender, Bowel Ssounds present  Extremities: No edema,   Neurological: more drowsy today but arousable, sitting in chair, confused , oriented to self , chronic left upper extremity contracture/paresis , rest of the extremities 4/5 motor , follows 1 step commands , no gross facial asymmetry  Skin: Non cellulitic, no rash, ulcers                CAPILLARY BLOOD GLUCOSE      POCT Blood Glucose.: 175 mg/dL (20 Nov 2022 11:03)  POCT Blood Glucose.: 110 mg/dL (20 Nov 2022 08:29)  POCT Blood Glucose.: 108 mg/dL (19 Nov 2022 21:08)  POCT Blood Glucose.: 203 mg/dL (19 Nov 2022 17:19)  POCT Blood Glucose.: 145 mg/dL (19 Nov 2022 13:05)        MEDICATIONS  (STANDING):  atenolol  Tablet 25 milliGRAM(s) Oral daily  dextrose 5%. 1000 milliLiter(s) (100 mL/Hr) IV Continuous <Continuous>  dextrose 5%. 1000 milliLiter(s) (50 mL/Hr) IV Continuous <Continuous>  dextrose 50% Injectable 25 Gram(s) IV Push once  dextrose 50% Injectable 12.5 Gram(s) IV Push once  dextrose 50% Injectable 25 Gram(s) IV Push once  ferrous    sulfate 325 milliGRAM(s) Oral daily  glucagon  Injectable 1 milliGRAM(s) IntraMuscular once  insulin glargine Injectable (LANTUS) 5 Unit(s) SubCutaneous at bedtime  insulin lispro (ADMELOG) corrective regimen sliding scale   SubCutaneous three times a day before meals  insulin lispro (ADMELOG) corrective regimen sliding scale   SubCutaneous at bedtime  sodium chloride 0.45%. 1000 milliLiter(s) (50 mL/Hr) IV Continuous <Continuous>  sodium chloride 0.9%. 1000 milliLiter(s) (60 mL/Hr) IV Continuous <Continuous>  vancomycin  IVPB 750 milliGRAM(s) IV Intermittent every 12 hours          
78 y/o F with pmhx IDDM , HTN, anemia , liver and splenic mass with lung and intrabdominal lymphadenopathy , chronic left upper extremity paresis/contracture seen by Dr ray neurosurgery sept 2021 and found to have spinal nerve compression at that time - spinal fusion recommended but patient an daughter declined surgery .patient    patient was brought in by daughter from home for fever 101 at home day PTA and 2 days of lethargy , decreased po intake, dark urine , for the past two days , has not been taking her meds, she spits out her meds  Patient is under care of [facility] Dr Lopez since march 2022 for anemia - , had MRI abd/chest in Oct which showed liver and splenic mass with lung and intra-abdominal lymphadenopathy , on 11/9/22 had PET scan outpt -plan was to follow up with Dr lopez last week for arranging tissue biopsy however missed appt.    Also since Jan 2022 patient is under care of [facility] neurology for worsening mentation and abnormal gait, balance issues  and issues with speech as per daughter  she was supposed to have EEG last month which was missed , patient had MRI brain with IV contrast at [facility] outpt  11/9/22- result unknown    in ED fever 101, RSV positive received 30cc/kg IVF bolus, received 2 units PRBC for hb 5.8       11/15 pt drowsy, but arousable , follows 1 step commands, but seems confused , responds to her name, does not know place , no po intake , denies CP, SOB, abd pain,  11/16/22: Sitting in chair, confused.   11/17/22: LN biopsy today, no new issues, Discussed with daughter yesterday in length regarding management plan  11/18/22: Sitting in chair, no new issues.       HPIAll other review of systems negative, except as noted in HPI        Vital Signs Last 24 Hrs  T(C): 37.9 (18 Nov 2022 08:03), Max: 38.2 (17 Nov 2022 21:40)  T(F): 100.2 (18 Nov 2022 08:03), Max: 100.8 (17 Nov 2022 21:40)  HR: 95 (18 Nov 2022 08:03) (95 - 105)  BP: 136/86 (18 Nov 2022 11:49) (136/86 - 149/63)  BP(mean): 85 (17 Nov 2022 21:40) (85 - 85)  RR: 18 (17 Nov 2022 21:40) (18 - 18)  SpO2: 100% (18 Nov 2022 08:03) (100% - 100%)    Parameters below as of 18 Nov 2022 08:03  Patient On (Oxygen Delivery Method): room air        Physical Exam:     Constitutional: NAD  HEENT: Atraumatic, PHILIPPE, Normal, No congestion  Respiratory: Breath Sounds normal, no rhonchi/wheeze  Cardiovascular: N S1S2;   Gastrointestinal: Abdomen soft, non tender, Bowel Ssounds present  Extremities: No edema,   Neurological: more drowsy today but arousable, sitting in chair, confused , oriented to self , chronic left upper extremity contracture/paresis , rest of the extremities 4/5 motor , follows 1 step commands , no gross facial asymmetry  Skin: Non cellulitic, no rash, ulcers                                    7.9    7.60  )-----------( 191      ( 17 Nov 2022 08:44 )             23.4     17 Nov 2022 08:44    136    |  107    |  12     ----------------------------<  65     4.0     |  27     |  0.46     Ca    8.8        17 Nov 2022 08:44        PT/INR - ( 17 Nov 2022 08:44 )   PT: 22.9 sec;   INR: 1.96 ratio           CAPILLARY BLOOD GLUCOSE      POCT Blood Glucose.: 208 mg/dL (18 Nov 2022 11:32)  POCT Blood Glucose.: 92 mg/dL (18 Nov 2022 08:20)  POCT Blood Glucose.: 86 mg/dL (18 Nov 2022 07:54)  POCT Blood Glucose.: 66 mg/dL (18 Nov 2022 07:32)  POCT Blood Glucose.: 150 mg/dL (17 Nov 2022 21:40)  POCT Blood Glucose.: 194 mg/dL (17 Nov 2022 17:05)            MEDICATIONS  (STANDING):  atenolol  Tablet 25 milliGRAM(s) Oral daily  dextrose 5%. 1000 milliLiter(s) (100 mL/Hr) IV Continuous <Continuous>  dextrose 5%. 1000 milliLiter(s) (50 mL/Hr) IV Continuous <Continuous>  dextrose 50% Injectable 25 Gram(s) IV Push once  dextrose 50% Injectable 12.5 Gram(s) IV Push once  dextrose 50% Injectable 25 Gram(s) IV Push once  ferrous    sulfate 325 milliGRAM(s) Oral daily  glucagon  Injectable 1 milliGRAM(s) IntraMuscular once  insulin glargine Injectable (LANTUS) 5 Unit(s) SubCutaneous at bedtime  insulin lispro (ADMELOG) corrective regimen sliding scale   SubCutaneous three times a day before meals  insulin lispro (ADMELOG) corrective regimen sliding scale   SubCutaneous at bedtime  sodium chloride 0.45%. 1000 milliLiter(s) (50 mL/Hr) IV Continuous <Continuous>  sodium chloride 0.9%. 1000 milliLiter(s) (60 mL/Hr) IV Continuous <Continuous>  vancomycin  IVPB 750 milliGRAM(s) IV Intermittent every 12 hours          
INTERVAL HPI/OVERNIGHT EVENTS:  Patient S&E at bedside.   Overnight pt spiked fever 102.8 tmax  pt slightly confused this am, emotionally labile  called daughter at bedside to speak with her and pt together   will look into biopsy vs to be done outpatient     PAST MEDICAL & SURGICAL HISTORY:  DM (diabetes mellitus)          FAMILY HISTORY:      VITAL SIGNS:  T(F): 97.8 (11-15-22 @ 07:11)  HR: 77 (11-15-22 @ 07:11)  BP: 123/60 (11-15-22 @ 00:03)  RR: 18 (11-15-22 @ 07:11)  SpO2: 98% (11-15-22 @ 07:11)  Wt(kg): --    PHYSICAL EXAM:    Constitutional: NAD, emotionally labile, anxious   Eyes: EOMI  Respiratory: rhonchi  Cardiovascular: RRR  Gastrointestinal: soft, NTND  Extremities: no c/c/e  Neurological: AAOx2      MEDICATIONS  (STANDING):  atenolol  Tablet 25 milliGRAM(s) Oral daily  dextrose 5%. 1000 milliLiter(s) (100 mL/Hr) IV Continuous <Continuous>  dextrose 5%. 1000 milliLiter(s) (50 mL/Hr) IV Continuous <Continuous>  dextrose 50% Injectable 25 Gram(s) IV Push once  dextrose 50% Injectable 12.5 Gram(s) IV Push once  dextrose 50% Injectable 25 Gram(s) IV Push once  ferrous    sulfate 325 milliGRAM(s) Oral daily  glucagon  Injectable 1 milliGRAM(s) IntraMuscular once  insulin glargine Injectable (LANTUS) 5 Unit(s) SubCutaneous at bedtime  insulin lispro (ADMELOG) corrective regimen sliding scale   SubCutaneous three times a day before meals  insulin lispro (ADMELOG) corrective regimen sliding scale   SubCutaneous at bedtime  sodium chloride 0.45%. 1000 milliLiter(s) (50 mL/Hr) IV Continuous <Continuous>  sodium chloride 0.9%. 1000 milliLiter(s) (50 mL/Hr) IV Continuous <Continuous>  vancomycin  IVPB 750 milliGRAM(s) IV Intermittent every 12 hours    MEDICATIONS  (PRN):  acetaminophen     Tablet .. 650 milliGRAM(s) Oral every 6 hours PRN Temp greater or equal to 38C (100.4F), Mild Pain (1 - 3)  aluminum hydroxide/magnesium hydroxide/simethicone Suspension 30 milliLiter(s) Oral every 4 hours PRN Dyspepsia  dextrose Oral Gel 15 Gram(s) Oral once PRN Blood Glucose LESS THAN 70 milliGRAM(s)/deciliter  melatonin 3 milliGRAM(s) Oral at bedtime PRN Insomnia  ondansetron Injectable 4 milliGRAM(s) IV Push every 8 hours PRN Nausea and/or Vomiting      Allergies    penicillins (Unknown)    Intolerances        LABS:                        8.6    10.54 )-----------( 225      ( 14 Nov 2022 09:50 )             26.0     11-15    141  |  108  |  17  ----------------------------<  137<H>  3.3<L>   |  28  |  0.60    Ca    9.4      15 Nov 2022 08:53    TPro  6.7  /  Alb  1.2<L>  /  TBili  0.7  /  DBili  x   /  AST  36  /  ALT  16  /  AlkPhos  294<H>  11-15    PT/INR - ( 14 Nov 2022 09:50 )   PT: 25.8 sec;   INR: 2.21 ratio               RADIOLOGY & ADDITIONAL TESTS:  Studies reviewed.  
INTERVAL HPI/OVERNIGHT EVENTS:  Patient S&E at bedside. No o/n events,     PAST MEDICAL & SURGICAL HISTORY:  DM (diabetes mellitus)          FAMILY HISTORY:      VITAL SIGNS:  T(F): 100.2 (11-18-22 @ 08:03)  HR: 95 (11-18-22 @ 08:03)  BP: 147/48 (11-18-22 @ 08:03)  RR: 18 (11-17-22 @ 21:40)  SpO2: 100% (11-18-22 @ 08:03)  Wt(kg): --    PHYSICAL EXAM:  Constitutional: NAD, more awake today, confused  Eyes: EOMI, sclera non-icteric  Respiratory: CTA b/l,  Cardiovascular: RRR,   Gastrointestinal: mild tenderness in RUQ abdomen   Extremities: no c/c/e  Neurological: AAOx2    MEDICATIONS  (STANDING):  atenolol  Tablet 25 milliGRAM(s) Oral daily  dextrose 5%. 1000 milliLiter(s) (100 mL/Hr) IV Continuous <Continuous>  dextrose 5%. 1000 milliLiter(s) (50 mL/Hr) IV Continuous <Continuous>  dextrose 50% Injectable 25 Gram(s) IV Push once  dextrose 50% Injectable 12.5 Gram(s) IV Push once  dextrose 50% Injectable 25 Gram(s) IV Push once  ferrous    sulfate 325 milliGRAM(s) Oral daily  glucagon  Injectable 1 milliGRAM(s) IntraMuscular once  insulin glargine Injectable (LANTUS) 5 Unit(s) SubCutaneous at bedtime  insulin lispro (ADMELOG) corrective regimen sliding scale   SubCutaneous three times a day before meals  insulin lispro (ADMELOG) corrective regimen sliding scale   SubCutaneous at bedtime  sodium chloride 0.45%. 1000 milliLiter(s) (50 mL/Hr) IV Continuous <Continuous>  sodium chloride 0.9% with potassium chloride 20 mEq/L 1000 milliLiter(s) (60 mL/Hr) IV Continuous <Continuous>  vancomycin  IVPB 750 milliGRAM(s) IV Intermittent every 12 hours    MEDICATIONS  (PRN):  acetaminophen     Tablet .. 650 milliGRAM(s) Oral every 6 hours PRN Temp greater or equal to 38C (100.4F), Mild Pain (1 - 3)  acetaminophen  Suppository .. 650 milliGRAM(s) Rectal every 6 hours PRN Temp greater or equal to 38C (100.4F)  aluminum hydroxide/magnesium hydroxide/simethicone Suspension 30 milliLiter(s) Oral every 4 hours PRN Dyspepsia  dextrose Oral Gel 15 Gram(s) Oral once PRN Blood Glucose LESS THAN 70 milliGRAM(s)/deciliter  guaiFENesin Oral Liquid (Sugar-Free) 100 milliGRAM(s) Oral every 6 hours PRN Cough  melatonin 3 milliGRAM(s) Oral at bedtime PRN Insomnia  ondansetron Injectable 4 milliGRAM(s) IV Push every 8 hours PRN Nausea and/or Vomiting      Allergies    penicillins (Unknown)    Intolerances        LABS:                        7.9    7.60  )-----------( 191      ( 17 Nov 2022 08:44 )             23.4     11-17    136  |  107  |  12  ----------------------------<  65<L>  4.0   |  27  |  0.46<L>    Ca    8.8      17 Nov 2022 08:44    TPro  6.1  /  Alb  1.1<L>  /  TBili  0.8  /  DBili  x   /  AST  43<H>  /  ALT  18  /  AlkPhos  255<H>  11-16    PT/INR - ( 17 Nov 2022 08:44 )   PT: 22.9 sec;   INR: 1.96 ratio         PTT - ( 16 Nov 2022 08:26 )  PTT:30.2 sec      RADIOLOGY & ADDITIONAL TESTS:  Studies reviewed.  
76 y/o F with pmhx IDDM , HTN, anemia , liver and splenic mass with lung and intrabdominal lymphadenopathy , chronic left upper extremity paresis/contracture seen by Dr ray neurosurgery sept 2021 and found to have spinal nerve compression at that time - spinal fusion recommended but patient an daughter declined surgery .patient    patient was brought in by daughter from home for fever 101 at home day PTA and 2 days of lethargy , decreased po intake, dark urine , for the past two days , has not been taking her meds, she spits out her meds    Patient is under care of [facility] Dr Lopez since march 2022 for anemia - , had MRI abd/chest in Oct which showed liver and splenic mass with lung and intra-abdominal lymphadenopathy , on 11/9/22 had PET scan outpt - plan was to follow up with Dr lopez last week for arranging tissue biopsy however missed appt.    Also since Jan 2022 patient is under care of [facility] neurology for worsening mentation and abnormal gait, balance issues  and issues with speech as per daughter  she was supposed to have EEG last month which was missed , patient had MRI brain with IV contrast at [facility] outpt  11/9/22- result unknown    in ED fever 101, RSV positive received 30cc/kg IVF bolus, received 2 units PRBC for hb 5.8       11/15 pt drowsy, but arousable , follows 1 step commands, but seems confused , responds to her name, does not know place , no po intake , denies CP, SOB, abd pain,      HPIAll other review of systems negative, except as noted in HPI          Constitutional:NAD  HEENT: Atraumatic, PHILIPPE, Normal, No congestion  Respiratory: Breath Sounds normal, no rhonchi/wheeze  Cardiovascular: N S1S2;   Gastrointestinal: Abdomen soft, non tender, Bowel Ssounds present  Extremities: No edema,   Neurological: more drowsy today but arousable, sitting in chair, confused , oriented to self , chronic left upper extremity contracture/paresis , rest of the extremities 4/5 motor , follows 1 step commands , no gross facial asymmetry  Skin: Non cellulitic, no rash, ulcers      PHYSICAL EXAM:    Daily     Daily     ICU Vital Signs Last 24 Hrs  T(C): 36.9 (15 Nov 2022 16:11), Max: 39.4 (15 Nov 2022 12:14)  T(F): 98.5 (15 Nov 2022 16:11), Max: 103 (15 Nov 2022 12:14)  HR: 83 (15 Nov 2022 16:11) (77 - 106)  BP: 117/44 (15 Nov 2022 16:11) (117/44 - 126/45)  BP(mean): --  ABP: --  ABP(mean): --  RR: 18 (15 Nov 2022 16:11) (18 - 18)  SpO2: 98% (15 Nov 2022 16:11) (97% - 99%)    O2 Parameters below as of 15 Nov 2022 16:11  Patient On (Oxygen Delivery Method): room air                            8.9    9.06  )-----------( 226      ( 15 Nov 2022 08:53 )             26.3       CBC Full  -  ( 15 Nov 2022 08:53 )  WBC Count : 9.06 K/uL  RBC Count : 3.87 M/uL  Hemoglobin : 8.9 g/dL  Hematocrit : 26.3 %  Platelet Count - Automated : 226 K/uL  Mean Cell Volume : 68.0 fl  Mean Cell Hemoglobin : 23.0 pg  Mean Cell Hemoglobin Concentration : 33.8 gm/dL  Auto Neutrophil # : 6.72 K/uL  Auto Lymphocyte # : 0.83 K/uL  Auto Monocyte # : 1.24 K/uL  Auto Eosinophil # : 0.01 K/uL  Auto Basophil # : 0.01 K/uL  Auto Neutrophil % : 74.1 %  Auto Lymphocyte % : 9.2 %  Auto Monocyte % : 13.7 %  Auto Eosinophil % : 0.1 %  Auto Basophil % : 0.1 %      11-15    141  |  108  |  17  ----------------------------<  137<H>  3.3<L>   |  28  |  0.60    Ca    9.4      15 Nov 2022 08:53    TPro  6.7  /  Alb  1.2<L>  /  TBili  0.7  /  DBili  x   /  AST  36  /  ALT  16  /  AlkPhos  294<H>  11-15      LIVER FUNCTIONS - ( 15 Nov 2022 08:53 )  Alb: 1.2 g/dL / Pro: 6.7 gm/dL / ALK PHOS: 294 U/L / ALT: 16 U/L / AST: 36 U/L / GGT: x             PT/INR - ( 14 Nov 2022 09:50 )   PT: 25.8 sec;   INR: 2.21 ratio                           MEDICATIONS  (STANDING):  atenolol  Tablet 25 milliGRAM(s) Oral daily  dextrose 5%. 1000 milliLiter(s) (100 mL/Hr) IV Continuous <Continuous>  dextrose 5%. 1000 milliLiter(s) (50 mL/Hr) IV Continuous <Continuous>  dextrose 50% Injectable 25 Gram(s) IV Push once  dextrose 50% Injectable 12.5 Gram(s) IV Push once  dextrose 50% Injectable 25 Gram(s) IV Push once  ferrous    sulfate 325 milliGRAM(s) Oral daily  glucagon  Injectable 1 milliGRAM(s) IntraMuscular once  insulin glargine Injectable (LANTUS) 5 Unit(s) SubCutaneous at bedtime  insulin lispro (ADMELOG) corrective regimen sliding scale   SubCutaneous three times a day before meals  insulin lispro (ADMELOG) corrective regimen sliding scale   SubCutaneous at bedtime  sodium chloride 0.45%. 1000 milliLiter(s) (50 mL/Hr) IV Continuous <Continuous>  sodium chloride 0.9%. 1000 milliLiter(s) (60 mL/Hr) IV Continuous <Continuous>  vancomycin  IVPB 750 milliGRAM(s) IV Intermittent every 12 hours          
76 y/o F with pmhx IDDM , HTN, anemia , liver and splenic mass with lung and intrabdominal lymphadenopathy , chronic left upper extremity paresis/contracture seen by Dr ray neurosurgery sept 2021 and found to have spinal nerve compression at that time - spinal fusion recommended but patient an daughter declined surgery .patient    patient was brought in by daughter from home for fever 101 at home day PTA and 2 days of lethargy , decreased po intake, dark urine , for the past two days , has not been taking her meds, she spits out her meds  Patient is under care of [facility] Dr Lopez since march 2022 for anemia - , had MRI abd/chest in Oct which showed liver and splenic mass with lung and intra-abdominal lymphadenopathy , on 11/9/22 had PET scan outpt -plan was to follow up with Dr lopez last week for arranging tissue biopsy however missed appt.    Also since Jan 2022 patient is under care of [facility] neurology for worsening mentation and abnormal gait, balance issues  and issues with speech as per daughter  she was supposed to have EEG last month which was missed , patient had MRI brain with IV contrast at [facility] outpt  11/9/22- result unknown    in ED fever 101, RSV positive received 30cc/kg IVF bolus, received 2 units PRBC for hb 5.8       11/15 pt drowsy, but arousable , follows 1 step commands, but seems confused , responds to her name, does not know place , no po intake , denies CP, SOB, abd pain,  11/16/22: Sitting in chair, confused.   11/17/22: LN biopsy today, no new issues, Discussed with daughter yesterday in length regarding management plan  11/18/22: Sitting in chair, no new issues.   11/19/22: Patient seen and examined. FS low this am      HPIAll other review of systems negative, except as noted in HPI        Vital Signs Last 24 Hrs  T(C): 37.1 (19 Nov 2022 08:37), Max: 38.7 (18 Nov 2022 16:52)  T(F): 98.8 (19 Nov 2022 08:37), Max: 101.6 (18 Nov 2022 16:52)  HR: 100 (19 Nov 2022 08:37) (100 - 117)  BP: 138/53 (19 Nov 2022 08:37) (137/53 - 148/51)  BP(mean): --  RR: 18 (19 Nov 2022 08:37) (18 - 18)  SpO2: 94% (19 Nov 2022 08:37) (94% - 97%)    Parameters below as of 19 Nov 2022 08:37  Patient On (Oxygen Delivery Method): room air        Physical Exam:     Constitutional: NAD  HEENT: Atraumatic, PHILIPPE, Normal, No congestion  Respiratory: Breath Sounds normal, no rhonchi/wheeze  Cardiovascular: N S1S2;   Gastrointestinal: Abdomen soft, non tender, Bowel Ssounds present  Extremities: No edema,   Neurological: more drowsy today but arousable, sitting in chair, confused , oriented to self , chronic left upper extremity contracture/paresis , rest of the extremities 4/5 motor , follows 1 step commands , no gross facial asymmetry  Skin: Non cellulitic, no rash, ulcers                                    7.9    7.60  )-----------( 191      ( 17 Nov 2022 08:44 )             23.4     17 Nov 2022 08:44    136    |  107    |  12     ----------------------------<  65     4.0     |  27     |  0.46     Ca    8.8        17 Nov 2022 08:44        PT/INR - ( 17 Nov 2022 08:44 )   PT: 22.9 sec;   INR: 1.96 ratio           CAPILLARY BLOOD GLUCOSE      POCT Blood Glucose.: 208 mg/dL (18 Nov 2022 11:32)  POCT Blood Glucose.: 92 mg/dL (18 Nov 2022 08:20)  POCT Blood Glucose.: 86 mg/dL (18 Nov 2022 07:54)  POCT Blood Glucose.: 66 mg/dL (18 Nov 2022 07:32)  POCT Blood Glucose.: 150 mg/dL (17 Nov 2022 21:40)  POCT Blood Glucose.: 194 mg/dL (17 Nov 2022 17:05)            MEDICATIONS  (STANDING):  atenolol  Tablet 25 milliGRAM(s) Oral daily  dextrose 5%. 1000 milliLiter(s) (100 mL/Hr) IV Continuous <Continuous>  dextrose 5%. 1000 milliLiter(s) (50 mL/Hr) IV Continuous <Continuous>  dextrose 50% Injectable 25 Gram(s) IV Push once  dextrose 50% Injectable 12.5 Gram(s) IV Push once  dextrose 50% Injectable 25 Gram(s) IV Push once  ferrous    sulfate 325 milliGRAM(s) Oral daily  glucagon  Injectable 1 milliGRAM(s) IntraMuscular once  insulin glargine Injectable (LANTUS) 5 Unit(s) SubCutaneous at bedtime  insulin lispro (ADMELOG) corrective regimen sliding scale   SubCutaneous three times a day before meals  insulin lispro (ADMELOG) corrective regimen sliding scale   SubCutaneous at bedtime  sodium chloride 0.45%. 1000 milliLiter(s) (50 mL/Hr) IV Continuous <Continuous>  sodium chloride 0.9%. 1000 milliLiter(s) (60 mL/Hr) IV Continuous <Continuous>  vancomycin  IVPB 750 milliGRAM(s) IV Intermittent every 12 hours          
76 y/o F with pmhx IDDM , HTN, anemia , liver and splenic mass with lung and intrabdominal lymphadenopathy , chronic left upper extremity paresis/contracture seen by Dr ray neurosurgery sept 2021 and found to have spinal nerve compression at that time - spinal fusion recommended but patient an daughter declined surgery .patient    patient was brought in by daughter from home for fever 101 at home day PTA and 2 days of lethargy , decreased po intake, dark urine , for the past two days , has not been taking her meds, she spits out her meds  Patient is under care of [facility] Dr Lopez since march 2022 for anemia - , had MRI abd/chest in Oct which showed liver and splenic mass with lung and intra-abdominal lymphadenopathy , on 11/9/22 had PET scan outpt -plan was to follow up with Dr lopez last week for arranging tissue biopsy however missed appt.    Also since Jan 2022 patient is under care of [facility] neurology for worsening mentation and abnormal gait, balance issues  and issues with speech as per daughter  she was supposed to have EEG last month which was missed , patient had MRI brain with IV contrast at [facility] outpt  11/9/22- result unknown    in ED fever 101, RSV positive received 30cc/kg IVF bolus, received 2 units PRBC for hb 5.8       11/15 pt drowsy, but arousable , follows 1 step commands, but seems confused , responds to her name, does not know place , no po intake , denies CP, SOB, abd pain,  11/16/22: Sitting in chair, confused.   11/17/22: LN biopsy today, no new issues, Discussed with daughter yesterday in length regarding management plan  11/18/22: Sitting in chair, no new issues.   11/19/22: Patient seen and examined. FS low this am  11/20/22: Patient seen and examined. Pleasantly confused. No new issues. Blood sugar better today  11/21/22: Patient seen and examined.  Biopsy C/W Hodgkin's lymphoma. discussed with Dr Lopez. He will discuss with the daughter regarding inpatient chemo therapy due to on going high fever  11/22: sitting up in bed, non verbal, appearing comfortable. peer to peer denied rehab       ROS All other review of systems negative, except as noted in HPI    Vital Signs Last 24 Hrs  T(C): 39.4 (22 Nov 2022 13:15), Max: 39.4 (22 Nov 2022 13:15)  T(F): 103 (22 Nov 2022 13:15), Max: 103 (22 Nov 2022 13:15)  HR: 95 (22 Nov 2022 08:02) (94 - 96)  BP: 137/80 (22 Nov 2022 08:02) (120/42 - 137/80)  BP(mean): --  RR: 18 (22 Nov 2022 08:02) (18 - 18)  SpO2: 95% (22 Nov 2022 08:02) (94% - 96%)    Parameters below as of 22 Nov 2022 08:02  Patient On (Oxygen Delivery Method): room air        Physical Exam:     Constitutional: NAD  HEENT: Atraumatic, PHILIPPE, Normal, No congestion  Respiratory: Breath Sounds normal, no rhonchi/wheeze  Cardiovascular: N S1S2;   Gastrointestinal: Abdomen soft, non tender, Bowel Ssounds present  Extremities: No edema,   Neurological: more drowsy today but arousable, laying in bed, confused , oriented to self , chronic left upper extremity contracture/paresis , rest of the extremities 4/5 motor , follows 1 step commands , no gross facial asymmetry  Skin: Non cellulitic, no rash, ulcers            MEDICATIONS  (STANDING):  atenolol  Tablet 25 milliGRAM(s) Oral daily  dextrose 5%. 1000 milliLiter(s) (50 mL/Hr) IV Continuous <Continuous>  dextrose 5%. 1000 milliLiter(s) (100 mL/Hr) IV Continuous <Continuous>  dextrose 50% Injectable 25 Gram(s) IV Push once  dextrose 50% Injectable 12.5 Gram(s) IV Push once  dextrose 50% Injectable 25 Gram(s) IV Push once  ferrous    sulfate 325 milliGRAM(s) Oral daily  glucagon  Injectable 1 milliGRAM(s) IntraMuscular once  insulin glargine Injectable (LANTUS) 5 Unit(s) SubCutaneous at bedtime  insulin lispro (ADMELOG) corrective regimen sliding scale   SubCutaneous three times a day before meals  insulin lispro (ADMELOG) corrective regimen sliding scale   SubCutaneous at bedtime    MEDICATIONS  (PRN):  acetaminophen     Tablet .. 650 milliGRAM(s) Oral every 6 hours PRN Temp greater or equal to 38C (100.4F), Mild Pain (1 - 3)  acetaminophen  Suppository .. 650 milliGRAM(s) Rectal every 6 hours PRN Temp greater or equal to 38C (100.4F)  aluminum hydroxide/magnesium hydroxide/simethicone Suspension 30 milliLiter(s) Oral every 4 hours PRN Dyspepsia  dextrose Oral Gel 15 Gram(s) Oral once PRN Blood Glucose LESS THAN 70 milliGRAM(s)/deciliter  guaiFENesin Oral Liquid (Sugar-Free) 100 milliGRAM(s) Oral every 6 hours PRN Cough  melatonin 3 milliGRAM(s) Oral at bedtime PRN Insomnia  ondansetron Injectable 4 milliGRAM(s) IV Push every 8 hours PRN Nausea and/or Vomiting    
78 y/o F with pmhx IDDM , HTN, anemia , liver and splenic mass with lung and intrabdominal lymphadenopathy , chronic left upper extremity paresis/contracture seen by Dr ray neurosurgery sept 2021 and found to have spinal nerve compression at that time - spinal fusion recommended but patient an daughter declined surgery .patient    patient was brought in by daughter from home for fever 101 at home day PTA and 2 days of lethargy , decreased po intake, dark urine , for the past two days , has not been taking her meds, she spits out her meds  Patient is under care of [facility] Dr Lopez since march 2022 for anemia - , had MRI abd/chest in Oct which showed liver and splenic mass with lung and intra-abdominal lymphadenopathy , on 11/9/22 had PET scan outpt -plan was to follow up with Dr lopez last week for arranging tissue biopsy however missed appt.    Also since Jan 2022 patient is under care of [facility] neurology for worsening mentation and abnormal gait, balance issues  and issues with speech as per daughter  she was supposed to have EEG last month which was missed , patient had MRI brain with IV contrast at [facility] outpt  11/9/22- result unknown    in ED fever 101, RSV positive received 30cc/kg IVF bolus, received 2 units PRBC for hb 5.8       11/15 pt drowsy, but arousable , follows 1 step commands, but seems confused , responds to her name, does not know place , no po intake , denies CP, SOB, abd pain,  11/16/22: Sitting in chair, confused.   11/17/22: LN biopsy today, no new issues, Discussed with daughter yesterday in length regarding management plan      HPIAll other review of systems negative, except as noted in HPI        Vital Signs Last 24 Hrs  T(C): 36.4 (17 Nov 2022 12:45), Max: 39.4 (16 Nov 2022 18:00)  T(F): 97.6 (17 Nov 2022 12:45), Max: 102.9 (16 Nov 2022 18:00)  HR: 89 (17 Nov 2022 12:45) (89 - 99)  BP: 130/55 (17 Nov 2022 12:45) (103/47 - 137/48)  BP(mean): 68 (16 Nov 2022 22:11) (68 - 68)  RR: 18 (17 Nov 2022 12:45) (16 - 18)  SpO2: 99% (17 Nov 2022 12:45) (94% - 99%)    Parameters below as of 17 Nov 2022 12:45  Patient On (Oxygen Delivery Method): room air          Physical Exam:     Constitutional: NAD  HEENT: Atraumatic, PHILIPPE, Normal, No congestion  Respiratory: Breath Sounds normal, no rhonchi/wheeze  Cardiovascular: N S1S2;   Gastrointestinal: Abdomen soft, non tender, Bowel Ssounds present  Extremities: No edema,   Neurological: more drowsy today but arousable, sitting in chair, confused , oriented to self , chronic left upper extremity contracture/paresis , rest of the extremities 4/5 motor , follows 1 step commands , no gross facial asymmetry  Skin: Non cellulitic, no rash, ulcers                                           7.9    7.60  )-----------( 191      ( 17 Nov 2022 08:44 )             23.4     17 Nov 2022 08:44    136    |  107    |  12     ----------------------------<  65     4.0     |  27     |  0.46     Ca    8.8        17 Nov 2022 08:44    TPro  6.1    /  Alb  1.1    /  TBili  0.8    /  DBili  x      /  AST  43     /  ALT  18     /  AlkPhos  255    16 Nov 2022 08:26    LIVER FUNCTIONS - ( 16 Nov 2022 08:26 )  Alb: 1.1 g/dL / Pro: 6.1 gm/dL / ALK PHOS: 255 U/L / ALT: 18 U/L / AST: 43 U/L / GGT: x           PT/INR - ( 17 Nov 2022 08:44 )   PT: 22.9 sec;   INR: 1.96 ratio         PTT - ( 16 Nov 2022 08:26 )  PTT:30.2 sec  CAPILLARY BLOOD GLUCOSE      POCT Blood Glucose.: 100 mg/dL (17 Nov 2022 13:07)  POCT Blood Glucose.: 69 mg/dL (17 Nov 2022 12:21)  POCT Blood Glucose.: 73 mg/dL (17 Nov 2022 07:45)  POCT Blood Glucose.: 121 mg/dL (16 Nov 2022 21:03)  POCT Blood Glucose.: 254 mg/dL (16 Nov 2022 16:25)                  MEDICATIONS  (STANDING):  atenolol  Tablet 25 milliGRAM(s) Oral daily  dextrose 5%. 1000 milliLiter(s) (100 mL/Hr) IV Continuous <Continuous>  dextrose 5%. 1000 milliLiter(s) (50 mL/Hr) IV Continuous <Continuous>  dextrose 50% Injectable 25 Gram(s) IV Push once  dextrose 50% Injectable 12.5 Gram(s) IV Push once  dextrose 50% Injectable 25 Gram(s) IV Push once  ferrous    sulfate 325 milliGRAM(s) Oral daily  glucagon  Injectable 1 milliGRAM(s) IntraMuscular once  insulin glargine Injectable (LANTUS) 5 Unit(s) SubCutaneous at bedtime  insulin lispro (ADMELOG) corrective regimen sliding scale   SubCutaneous three times a day before meals  insulin lispro (ADMELOG) corrective regimen sliding scale   SubCutaneous at bedtime  sodium chloride 0.45%. 1000 milliLiter(s) (50 mL/Hr) IV Continuous <Continuous>  sodium chloride 0.9%. 1000 milliLiter(s) (60 mL/Hr) IV Continuous <Continuous>  vancomycin  IVPB 750 milliGRAM(s) IV Intermittent every 12 hours          
HPI: Pt is a 77y old Female with hx of IDDM , HTN, anemia , liver and splenic mass with lung and intrabdominal lymphadenopathy , chronic left upper extremity paresis/contracture seen by Dr ray neurosurgery sept 2021 and found to have spinal nerve compression at that time - spinal fusion recommended but patient an daughter declined surgery .patient patient was brought in by daughter from home for fever 101 at home day PTA and 2 days of lethargy , decreased po intake, dark urine , for the past two days , has not been taking her meds, she spits out her meds  Patient is under care of heme onc Dr Lopez since march 2022 for anemia - , had MRI abd/chest in Oct which showed liver and splenic mass with lung and intra-abdominal lymphadenopathy , on 11/9/22 had PET scan outpt -plan was to follow up with Dr lopez last week for arranging tissue biopsy however missed appt. Palliative medicine consulted to help establish GOC and advance care planning     11/16/2022 pt seen and examined with no family at bedside, patient appears comfortable and was happily smiling in bed, but was unable to answer any questions followed simple commands, GOC meeting scheduled for 11/17/2022 at 1PM   11/17/22 Seen and  examined at bedside. Unresponsive to verbal stimuli.   11/22/22 Seen and examined at bedside. Lethargic and sleeping at the time of my visit  11/23/22 Seen and examined at bedside. Awake and alert, disoriented to time and place. Appears comfortable    PAIN: ( )Yes   (x )No  DYSPNEA: ( ) Yes  (x ) No      PAST MEDICAL & SURGICAL HISTORY:  DM (diabetes mellitus)  SOCIAL HX: lives at home   Hx opiate tolerance ( )YES  ( )NO    Baseline ADLs  (Prior to Admission)  ( ) Independent   (x )Dependent    FAMILY HISTORY:      Review of Systems:    Unable to obtain/Limited due to: AMS   ICU Vital Signs Last 24 Hrs  T(C): 36.4 (23 Nov 2022 07:35), Max: 39.4 (22 Nov 2022 13:15)  T(F): 97.6 (23 Nov 2022 07:35), Max: 103 (22 Nov 2022 13:15)  HR: 77 (23 Nov 2022 07:35) (77 - 99)  BP: 124/50 (23 Nov 2022 07:35) (115/43 - 142/63)  RR: 18 (22 Nov 2022 23:35) (18 - 18)  SpO2: 96% (23 Nov 2022 07:35) (92% - 96%)      PPSV2: 30 %  FAST: unsure of baseline - recently worsening cognitive decline     General: elderly female in bed  Mental Status: minimally responsive   HEENT: mmm + temporal wasting   Lungs: diminished breath sounds b/l   Cardiac: s1s2 +   GI: nontender, nondistended, +BS   : no suprapubic tenderness   Ext: no edema   Neuro: weakness     LABS:                             8.3    9.15  )-----------( 244      ( 22 Nov 2022 08:24 )             25.9       139  |  104  |  16  ----------------------------<  143<H>  3.8   |  31  |  0.64    Ca    9.7      22 Nov 2022 08:24    TPro  6.4  /  Alb  1.0<L>  /  TBili  1.0  /  DBili  x   /  AST  96<H>  /  ALT  43  /  AlkPhos  494<H>  11-21     PTT - ( 16 Nov 2022 08:26 )  PTT:30.2 sec    Allergies    penicillins (Unknown)    Intolerances  MEDICATIONS  (STANDING):  atenolol  Tablet 25 milliGRAM(s) Oral daily  dextrose 5%. 1000 milliLiter(s) (50 mL/Hr) IV Continuous <Continuous>  dextrose 5%. 1000 milliLiter(s) (100 mL/Hr) IV Continuous <Continuous>  dextrose 50% Injectable 25 Gram(s) IV Push once  dextrose 50% Injectable 12.5 Gram(s) IV Push once  dextrose 50% Injectable 25 Gram(s) IV Push once  ferrous    sulfate 325 milliGRAM(s) Oral daily  glucagon  Injectable 1 milliGRAM(s) IntraMuscular once  insulin glargine Injectable (LANTUS) 5 Unit(s) SubCutaneous at bedtime  insulin lispro (ADMELOG) corrective regimen sliding scale   SubCutaneous three times a day before meals  insulin lispro (ADMELOG) corrective regimen sliding scale   SubCutaneous at bedtime    MEDICATIONS  (PRN):  acetaminophen     Tablet .. 650 milliGRAM(s) Oral every 6 hours PRN Temp greater or equal to 38C (100.4F), Mild Pain (1 - 3)  acetaminophen  Suppository .. 650 milliGRAM(s) Rectal every 6 hours PRN Temp greater or equal to 38C (100.4F)  aluminum hydroxide/magnesium hydroxide/simethicone Suspension 30 milliLiter(s) Oral every 4 hours PRN Dyspepsia  dextrose Oral Gel 15 Gram(s) Oral once PRN Blood Glucose LESS THAN 70 milliGRAM(s)/deciliter  guaiFENesin Oral Liquid (Sugar-Free) 100 milliGRAM(s) Oral every 6 hours PRN Cough  melatonin 3 milliGRAM(s) Oral at bedtime PRN Insomnia  ondansetron Injectable 4 milliGRAM(s) IV Push every 8 hours PRN Nausea and/or Vomiting          RADIOLOGY/ADDITIONAL STUDIES:          
HPI: Pt is a 77y old Female with hx of IDDM , HTN, anemia , liver and splenic mass with lung and intrabdominal lymphadenopathy , chronic left upper extremity paresis/contracture seen by Dr ray neurosurgery sept 2021 and found to have spinal nerve compression at that time - spinal fusion recommended but patient an daughter declined surgery .patient patient was brought in by daughter from home for fever 101 at home day PTA and 2 days of lethargy , decreased po intake, dark urine , for the past two days , has not been taking her meds, she spits out her meds  Patient is under care of heme onc Dr Lopez since march 2022 for anemia - , had MRI abd/chest in Oct which showed liver and splenic mass with lung and intra-abdominal lymphadenopathy , on 11/9/22 had PET scan outpt -plan was to follow up with Dr lopez last week for arranging tissue biopsy however missed appt. Palliative medicine consulted to help establish GOC and advance care planning     11/16/2022 pt seen and examined with no family at bedside, patient appears comfortable and was happily smiling in bed, but was unable to answer any questions followed simple commands, GOC meeting scheduled for 11/17/2022 at 1PM   11/17/22 Seen and  examined at bedside. Unresponsive to verbal stimuli.   11/22/22 Seen and examined at bedside. Lethargic and sleeping at the time of my visit    PAIN: ( )Yes   (x )No  DYSPNEA: ( ) Yes  (x ) No      PAST MEDICAL & SURGICAL HISTORY:  DM (diabetes mellitus)  SOCIAL HX: lives at home   Hx opiate tolerance ( )YES  ( )NO    Baseline ADLs  (Prior to Admission)  ( ) Independent   (x )Dependent    FAMILY HISTORY:      Review of Systems:    Unable to obtain/Limited due to: AMS     ICU Vital Signs Last 24 Hrs  T(C): 38.1 (22 Nov 2022 08:02), Max: 38.9 (21 Nov 2022 17:12)  T(F): 100.6 (22 Nov 2022 08:02), Max: 102 (21 Nov 2022 17:12)  HR: 95 (22 Nov 2022 08:02) (94 - 96)  BP: 137/80 (22 Nov 2022 08:02) (120/42 - 137/80)  RR: 18 (22 Nov 2022 08:02) (18 - 18)  SpO2: 95% (22 Nov 2022 08:02) (94% - 96%)    PPSV2: 30 %  FAST: unsure of baseline - recently worsening cognitive decline     General: elderly female in bed  Mental Status: minimally responsive   HEENT: mmm + temporal wasting   Lungs: diminished breath sounds b/l   Cardiac: s1s2 +   GI: nontender, nondistended, +BS   : no suprapubic tenderness   Ext: no edema   Neuro: weakness     LABS:                              8.3    9.15  )-----------( 244      ( 22 Nov 2022 08:24 )             25.9                        11-22    139  |  104  |  16  ----------------------------<  143<H>  3.8   |  31  |  0.64    Ca    9.7      22 Nov 2022 08:24    TPro  6.4  /  Alb  1.0<L>  /  TBili  1.0  /  DBili  x   /  AST  96<H>  /  ALT  43  /  AlkPhos  494<H>  11-21     PTT - ( 16 Nov 2022 08:26 )  PTT:30.2 sec    Allergies    penicillins (Unknown)    Intolerances    MEDICATIONS  (STANDING):  atenolol  Tablet 25 milliGRAM(s) Oral daily  dextrose 5%. 1000 milliLiter(s) (50 mL/Hr) IV Continuous <Continuous>  dextrose 5%. 1000 milliLiter(s) (100 mL/Hr) IV Continuous <Continuous>  dextrose 50% Injectable 25 Gram(s) IV Push once  dextrose 50% Injectable 12.5 Gram(s) IV Push once  dextrose 50% Injectable 25 Gram(s) IV Push once  ferrous    sulfate 325 milliGRAM(s) Oral daily  glucagon  Injectable 1 milliGRAM(s) IntraMuscular once  insulin glargine Injectable (LANTUS) 5 Unit(s) SubCutaneous at bedtime  insulin lispro (ADMELOG) corrective regimen sliding scale   SubCutaneous three times a day before meals  insulin lispro (ADMELOG) corrective regimen sliding scale   SubCutaneous at bedtime    MEDICATIONS  (PRN):  acetaminophen     Tablet .. 650 milliGRAM(s) Oral every 6 hours PRN Temp greater or equal to 38C (100.4F), Mild Pain (1 - 3)  acetaminophen  Suppository .. 650 milliGRAM(s) Rectal every 6 hours PRN Temp greater or equal to 38C (100.4F)  aluminum hydroxide/magnesium hydroxide/simethicone Suspension 30 milliLiter(s) Oral every 4 hours PRN Dyspepsia  dextrose Oral Gel 15 Gram(s) Oral once PRN Blood Glucose LESS THAN 70 milliGRAM(s)/deciliter  guaiFENesin Oral Liquid (Sugar-Free) 100 milliGRAM(s) Oral every 6 hours PRN Cough  melatonin 3 milliGRAM(s) Oral at bedtime PRN Insomnia  ondansetron Injectable 4 milliGRAM(s) IV Push every 8 hours PRN Nausea and/or Vomiting        RADIOLOGY/ADDITIONAL STUDIES:

## 2022-11-23 NOTE — PROGRESS NOTE ADULT - REASON FOR ADMISSION
anemia/sepsis

## 2022-11-23 NOTE — PROGRESS NOTE ADULT - PROVIDER SPECIALTY LIST ADULT
Hospitalist
Heme/Onc
Heme/Onc
Hospitalist
Hospitalist
Heme/Onc
Palliative Care
Heme/Onc
Heme/Onc
Hospitalist
Infectious Disease
Palliative Care
Hospitalist
Palliative Care

## 2022-11-23 NOTE — DISCHARGE NOTE NURSING/CASE MANAGEMENT/SOCIAL WORK - [id number]_ALL_CORE
For information on Fall & Injury Prevention, visit: [email or web address] OR  [email or web address] OR  https://www.cdc.gov/steadi/patient.html

## 2022-11-23 NOTE — DISCHARGE NOTE PROVIDER - NSDCQMCOGNITION_NEU_ALL_CORE
Hand Therapy Progress Note  Current Date: 5/17/22  Reporting Period: 2/8/22 to 5/17/2022    Diagnosis: Clumsiness; Hand Weakness  DOI: 2 years ago  MD Order Date: 11/11/2021    Subjective:  Roxanna Celis is a 53 year old female.    S:  Subjective changes as noted by patient: Symptoms are a little better.  Dr. Rivera thinks the [place] may be helping a little.    Functional changes noted by patient: still difficulty with most tasks     Response to previous treatment:  good  Patient has noted adverse reaction to:   None      Objective:  Pain Level (Scale 0-10)   11/26/2021 2/8/22 5/17/22   At Rest R: 7/10   L: 7.5/10 R: 7/10  L: 9/10 R: 8/10  L: 8/10   With Use R: 9/10  L: 9/10 R: 10/10  L: 10/10 R: 10/10   L: 10/10     Pain Description  Date 11/26/2021 2/8/22 5/17/22   Location B volar wrist pain at crease, shooting pain from hand to elbow, lateral epicondyles  B volar wrists and radiates B to pecs and tips of fingers B volar wrists, tips of fingers   Pain Quality Aching, Burning, Dull, Numb, Sharp, Shooting, Tingling and Tiring Aching, Burning, Dull, Numb, Sharp, Shooting, Tingling and Tiring, Electricity Burning, Numb, pins and needles, dull   Frequency constant   constant constant   Pain is worst  daytime or nighttime Daytime or nighttime Daytime or nighttime   Exacerbated by  with use use uese   Relieved by rest rest rest   Progression Gradually worsening unchanged Improved slightly     Posture  Normal    Sensation  Decreased Radial Nerve distribution per pt report 5/17/22: has decreased    ROM  Pain Report: - none  + mild    ++ moderate    +++ severe   Elbow 11/26/2021 11/26/2021 2/8/22 2/8/22   AROM (PROM) R L R L   Extension 0 -8 0 0   Flexion 127 124 138 135   Supination 86 76 75 83   Pronation 88       86 78 80     Wrist 11/26/2021 11/26/2021 2/8/22 2/8/22   AROM (PROM) R L R L   Extension 59 43 56 60   Flexion 12 14 67 35   RD 1 6 19 19   UD 23 27 24 35     Resisted Testing  Pain Report:  - none    + mild     ++ moderate    +++ severe    11/26/2021   Elbow Extension R: 4/5, 6/10 pain  L: 4/5, 8/10 pain   Elbow Flexion R: 4/5, 5/10 pain  L: 4/5, 8/10 pain   Supination  R: 4/5, 7/10 pain  L: 4/5, 9/10 pain   Pronation R: 5/5, 7/10 pain  L: 3/5, 9.9/10 pain   Wrist Ext with RD, Elbow at side R: NT - too painful  L: NT - too painful   Wrist Ext with UD, Elbow at side R: NT - too painful  L: NT - too painful   Wrist Flex with RD, Elbow at side R: NT - too painful  L: NT - too painful   Wrist Flex with UD, Elbow at side R: NT - too painful  L: NT - too painful   EDC with Elbow at side R: 4/5, 9.5/10 pain  L: NT - too painful   Long Finger Test R: 5/5, 8/10 pain  L: 5/5, 9/10 pain     Neural Tension Testing  RNT: Radial Neurodynamic Test (based on RILEY Argueta's ULNT)   11/26/2021   0-5 Scale R: 2/5    L: 1/5   Position:   0/5: Arm across abdomen in coronal plane  1/5: Depress shoulder, ER to neutral ABD shoulder to 45 degrees  2/5: IR shoulder to end range, keep elbow at 90 degrees  3/5: Extend elbow to 0 degrees  4/5: Fully pronate forearm  5/5: Flex wrist and fingers with UD  Notes:    (+) indicates beyond grade level but less than jail to next level    (-) indicates over jail to level    S1  onset/change of patient's symptoms    S2 definite stop point based on patient's discomfort level    Strength   (Measured in pounds)  Not tested at this time due to current pain level. Pt reports difficulty with lifting items like a cup and utensils during mealtimes.     Palpation  Pain Report:  - none    + mild    ++ moderate    +++ severe    11/26/2021   Proximal Triceps R: 5/10  L: 9.9/10   Spiral Groove R: 5/10  L: 9.9/10   Distal Triceps R: 9/10  L: 9.9/10   ECRB at LEP R: 8/10  L: 6/10   ECU at LEP R: 4/10  L: 7/10   EDC at LEP R: 8/10  L: 8.5/10   Radial Head R: 9/10  L: 10/10   Extensor Wad R: 9/10  L: 10/10   PIN Site R: 10/10  L: 9/10     Assessment:  Response to therapy has been improvement to:  ROM of Wrist:  All Planes  per visual observation  Thumb:  All Planes  Response to therapy has been lack of progress in:  Pain:  No change    Overall Assessment:  Patient's symptoms are resolving.  Patient is ready to progress to more complex exercises.  Patient is ready to progress to next level of protocol.  Patient is becoming more independent in home exercise program  Patient would benefit from continued therapy to achieve rehab potential  STG/LTG:  STGoals have been reviewed;  see goal sheet for details and updates.  LTGoals have been reviewed;  see goal sheet for details and updates.    I have re-evaluated this patient and find that the nature, scope, duration and intensity of the therapy is appropriate for the medical condition of the patient.    Plan:  Frequency:  1 X week, once daily  Duration:  for 8 weeks    Treatment Plan:    Modalities:    US, Fluidotherapy and Paraffin   Therapeutic Exercise:    AROM, AAROM, PROM, Tendon Gliding, Isotonics, Isometrics and Stabilization  Therapeutic Activities:  Functional activities   Neuromuscular re-ed:   Nerve Gliding, Kinesiotaping and Stabilization  Manual Techniques:   Joint mobilization, Friction massage, Myofascial release and Manual edema mobilization  Orthotic Fabrication:    Static  Self Care:    Self Care Tasks and Ergonomic Considerations    Discharge Plan:  Achieve all LTG.  Independent in home treatment program.  Reach maximal therapeutic benefit.    Home Program:   Avoid activities that exacerbate pain in the elbow  Lift with elbows at side and forearms in neutral/supinated position  TENNIS ELBOW PREVENTION  EMR Notes  HEP - Sets  Reps  Sessions per day  Notes  Icing  EMR Notes  HEP - Sets  Reps  Sessions per day  Notes ice at least 1x/day, for 10-15 minutes Can also do icing after an activity that is painful  Warmth  EMR Notes  HEP - Sets  Reps  Sessions per day  Notes  Ball Massage  EMR Notes  HEP - Sets  Reps  Sessions per day 1-2  Notes Massage forearm  Forearm PROM Advanced  Flexor Stretch  EMR Notes  HEP - Sets  Reps 10 reps. hold 15-30 sec  Sessions per day 2-3  Notes hold 15-20 sec  Forearm Passive Range of Motion Extensor Stretch  EMR Notes  HEP - Sets  Reps 10 reps. hold 15-30 sec  Sessions per day 2-3  Notes PAIN FREE  Nerve Gliding Proximal Radial  EMR Notes  HEP - Sets  Reps 10  Sessions per day 1-2  Notes Ignore head movements. Hold for 5 seconds in position where you start to feel the pull.    Next Visit:  Review HEP  Review Orthosis Fit   Nerve Gliding  MFR to extensors, avoiding PIN site     Difficulty concentrating/Difficulty making decisions/Difficulty remembering

## 2022-11-23 NOTE — DISCHARGE NOTE PROVIDER - NSDCMRMEDTOKEN_GEN_ALL_CORE_FT
atenolol 25 mg oral tablet: 1 tab(s) orally once a day  ferrous sulfate 325 mg (65 mg elemental iron) oral tablet: 1 tab(s) orally once a day  NovoLIN 70/30 subcutaneous suspension: 5 unit(s) subcutaneous 2 times a day

## 2022-11-23 NOTE — PROGRESS NOTE ADULT - ASSESSMENT
Pt is a 77y old Female with hx of IDDM , HTN, anemia , liver and splenic mass with lung and intraabdominal lymphadenopathy , chronic left upper extremity paresis/contracture seen by Dr ray neurosurgery sept 2021 and found to have spinal nerve compression at that time - spinal fusion recommended but patient an daughter declined surgery patient was brought in by daughter from home for fever 101 at home day PTA and 2 days of lethargy     Process of Care  --Reviewed dx/treatment problems and alignment with Goals of Care    Physical Aspects of Care  --Pain  patient appears comfortable at this time   c/w current managment    --Bowel Regimen  risk for constipation d/t immobility  daily dulcolax    --Dyspnea  No SOB at this time  comfortable and in NAD    --Nausea Vomiting  denies    --Weakness  PT as tolerated     Psychological and Psychiatric Aspects of Care:   --Greif/Bereavment: emotional support provided  --Hx of psychiatric dx: none  -Pastoral Care Available PRN     Social Aspects of Care  -SW involved     Cultural Aspects  -Primary Language: English    Goals of Care:     We discussed Palliative Care team being a supportive team when a patient has ongoing illnesses.  We also discussed that it is not an end of life care service, but can help navigate symptoms and emotional support througout their hospital stay here.    Ethical and Legal Aspects:   NA    Capacity- pt does not have capacity     HCP/Surrogate: NO HCP on file - patients daughter is     Code Status- DNR/DNI  -See GOC discussion note  MOLST completed on chart  Dispo Plan- ongoing discussions   Home with hospice services    Discussed With: Dr. Wallace

## 2022-11-23 NOTE — DISCHARGE NOTE PROVIDER - HOSPITAL COURSE
78 y/o F with pmhx IDDM , HTN, anemia , liver and splenic mass with lung and intrabdominal ymphadenopathy , chronic left upper extremity paresis/contracture seen by Dr ray neurosurgery sept 2021 and found to have spinal nerve compression at that time - spinal fusion recommended but patient an daughter declined surgery .patient    patient was brought in by daughter from home for fever 101 at home day PTA and 2 days of lethargy , decreased po intake, dark urine , for the past two days , has not been taking her meds, she spits out her meds  Patient is under care of [facility] Dr Lopez since march 2022 for anemia - , had MRI abd/chest in Oct which showed liver and splenic mass with lung and intra-abdominal lymphadenopathy , on 11/9/22 had PET scan outpt -plan was to follow up with Dr lopez last week for arranging tissue biopsy however missed appt.    Hospital course:   pt admitted for acute sepsis due to RSV, UTI, anemia received 2 units PRBC for hb 5.8.  Urine culture with E fecaelis Completed IV  vanco per ID. pt evaled by IR - pt had Biopsy on 11/17  C/W Hodgkin's lymphoma evaled by Dr. Lopez MRI brain no masses. pt with overall poor prognosis, evaled by pall care team - decision for home with home hospice.       # Acute on chronic severe anemia   # hx liver and spleen mass and lung and abdominal lymphadenopathy MRI abd/chest done in Oct 2022 due to Hodkins Lymphoma   # Coagulopathy , not on blood thinners   no evidence of gross bleed  s/p 2 units PRBC in ED, hb stable   S/P LN biopsy-HL  PET scan neg  -Dr Lopez follow up appreciated- we discussed options such as AVD (likely omit bleomycin given frailty to avoid pulmonary toxicity)+/- brentuximab vs best supportive care and hospice  she wants to take patient home with home services, and wants to try her best to get her to [place]- would recommend PT in am again to evaluate strength for patient - I discussed that this will be difficult given her frail nature, malnutrition with albumin 1 but she is aware and is willing to care for her at home and try given that pts  is in [place] as well - recommend dc home with home hospice services  flow cytometry neg  MRI brain with IC con no masses      # sepsis and dehydration  present on admission  E fecaelis UTI   Completed IV  vanco   ID consult appreciated    # Acute metabolic encephalopathy /suspect underlying dementia  chronic left upper extremity contracture/paresis  due to radiculopathy  chronic ataxia/imbalanced gait and progressive memory worsening since jan 2022  ? worsening dysphagia over months    sees [facility] neurology as outpatient   MRI brain IV con done 11/9 as outpatient -no masses as per heme  swallow eval- easy to chew    # Failure to thrive severe protein calorie malnutrition   declines po intake   palliative consult for GOC appreciated    #Hx HTN, DM , anemia on iron pills   continue home meds   held lisinopril , hold Novolin if NPO     # vte prophylaxis   INR near 2    pt is dnr/dni after conv w/ pall care team dc home with home hospice     Disposition: follow oncology recommendation possibly dc to home / home hospice -  pt was denied placement to [facility] despite peer to peer- discussed with dr merchant of [employer]     spent __49__ mins preparing dc.   above plan discussed with pt, (family), bedside RN and MD Johnson 76 y/o F with pmhx IDDM , HTN, anemia , liver and splenic mass with lung and intrabdominal ymphadenopathy , chronic left upper extremity paresis/contracture seen by Dr ray neurosurgery sept 2021 and found to have spinal nerve compression at that time - spinal fusion recommended but patient an daughter declined surgery .patient    patient was brought in by daughter from home for fever 101 at home day PTA and 2 days of lethargy , decreased po intake, dark urine , for the past two days , has not been taking her meds, she spits out her meds  Patient is under care of [facility] Dr Lopez since march 2022 for anemia - , had MRI abd/chest in Oct which showed liver and splenic mass with lung and intra-abdominal lymphadenopathy , on 11/9/22 had PET scan outpt -plan was to follow up with Dr lopez last week for arranging tissue biopsy however missed appt.    Hospital course:   pt admitted for acute sepsis due to RSV, UTI, anemia received 2 units PRBC for hb 5.8.  Urine culture with E fecaelis Completed IV  vanco per ID. pt evaled by IR - pt had Biopsy on 11/17  C/W Hodgkin's lymphoma evaled by Dr. Lopez MRI brain no masses. pt with overall poor prognosis, evaled by pall care team - decision for home with home hospice.       # Acute on chronic severe anemia   # hx liver and spleen mass and lung and abdominal lymphadenopathy MRI abd/chest done in Oct 2022 due to Hodkins Lymphoma   # Coagulopathy , not on blood thinners   no evidence of gross bleed  s/p 2 units PRBC in ED, hb stable   S/P LN biopsy-HL  PET scan neg  -Dr Lopez follow up appreciated- we discussed options such as AVD (likely omit bleomycin given frailty to avoid pulmonary toxicity)+/- brentuximab vs best supportive care and hospice  she wants to take patient home with home services, and wants to try her best to get her to [place]- would recommend PT in am again to evaluate strength for patient - I discussed that this will be difficult given her frail nature, malnutrition with albumin 1 but she is aware and is willing to care for her at home and try given that pts  is in [place] as well - recommend dc home with home hospice services  flow cytometry neg  MRI brain with IC con no masses      # sepsis and dehydration  present on admission  E fecaelis UTI   Completed IV  vanco   ID consult appreciated    # Acute metabolic encephalopathy /suspect underlying dementia  chronic left upper extremity contracture/paresis  due to radiculopathy  chronic ataxia/imbalanced gait and progressive memory worsening since jan 2022  ? worsening dysphagia over months    sees [facility] neurology as outpatient   MRI brain IV con done 11/9 as outpatient -no masses as per heme  swallow eval- easy to chew    # Failure to thrive severe protein calorie malnutrition   declines po intake   palliative consult for GOC appreciated    #Hx HTN, DM , anemia on iron pills   continue home meds   held lisinopril , hold Novolin if NPO     # vte prophylaxis   INR near 2    pt is dnr/dni after conv w/ pall care team dc home with home hospice     Disposition: follow oncology recommendation possibly dc to home / home hospice -  pt was denied placement to [facility] despite peer to peer- discussed with dr merchant of [employer]     spent __49__ mins preparing dc.   above plan discussed with pt, (family), bedside RN and MD Johnson     Attending Attestation:  Pt seen and examined with RENITA Markham. I personally had a face to face encounter with the patient, examined the patient myself and reviewed the plan of care with the patient and said RENITA Markham. I agree with the assessment and plan of RENITA Markham as stated and discussed.

## 2022-11-23 NOTE — DISCHARGE NOTE PROVIDER - NSDCPNSUBOBJ_GEN_ALL_CORE
ROS unable to obtain due to advanced dementia     Vital Signs Last 24 Hrs  T(C): 36.4 (23 Nov 2022 07:35), Max: 39 (22 Nov 2022 23:35)  T(F): 97.6 (23 Nov 2022 07:35), Max: 102.2 (22 Nov 2022 23:35)  HR: 82 (23 Nov 2022 10:00) (77 - 99)  BP: 134/58 (23 Nov 2022 10:00) (115/43 - 142/63)  BP(mean): --  RR: 18 (22 Nov 2022 23:35) (18 - 18)  SpO2: 96% (23 Nov 2022 07:35) (92% - 96%) ---- room air      Physical Exam:     Constitutional: NAD, frail and eldery appearing   HEENT: Atraumatic, PHILIPPE, Normal, No congestion  Respiratory: Breath Sounds normal, no rhonchi/wheeze  Cardiovascular: N S1S2;   Gastrointestinal: Abdomen soft, non tender, Bowel Ssounds present  Extremities: No edema,   Neurological: more drowsy today but arousable, laying in bed, confused , oriented to self , chronic left upper extremity contracture/paresis , rest of the extremities 4/5 motor , follows 1 step commands , no gross facial asymmetry  Skin: Non cellulitic, no rash, ulcers      LABS                         8.3    9.15  )-----------( 244      ( 22 Nov 2022 08:24 )             25.9     11-22    139  |  104  |  16  ----------------------------<  143<H>  3.8   |  31  |  0.64    Ca    9.7      22 Nov 2022 08:24          < from: Xray Chest 1 View- PORTABLE-Urgent (11.13.22 @ 00:51) >    Findings/  Impression:    Lungs are clear.    No large effusions or pneumothoraces    Cardiomediastinal silhouette is within normal limits.    Osseous structures are unremarkable for age.    --- End of Report ---      MOHAMMAD HALAIBEH MD; AttendingRadiologist  This document has been electronically signed. Nov 13 2022  2:56PM    < end of copied text >

## 2022-11-28 DIAGNOSIS — I10 ESSENTIAL (PRIMARY) HYPERTENSION: ICD-10-CM

## 2022-11-28 DIAGNOSIS — A41.81 SEPSIS DUE TO ENTEROCOCCUS: ICD-10-CM

## 2022-11-28 DIAGNOSIS — J06.9 ACUTE UPPER RESPIRATORY INFECTION, UNSPECIFIED: ICD-10-CM

## 2022-11-28 DIAGNOSIS — F03.90 UNSPECIFIED DEMENTIA WITHOUT BEHAVIORAL DISTURBANCE: ICD-10-CM

## 2022-11-28 DIAGNOSIS — D68.9 COAGULATION DEFECT, UNSPECIFIED: ICD-10-CM

## 2022-11-28 DIAGNOSIS — Z91.14 PATIENT'S OTHER NONCOMPLIANCE WITH MEDICATION REGIMEN: ICD-10-CM

## 2022-11-28 DIAGNOSIS — Z88.0 ALLERGY STATUS TO PENICILLIN: ICD-10-CM

## 2022-11-28 DIAGNOSIS — R62.7 ADULT FAILURE TO THRIVE: ICD-10-CM

## 2022-11-28 DIAGNOSIS — R16.1 SPLENOMEGALY, NOT ELSEWHERE CLASSIFIED: ICD-10-CM

## 2022-11-28 DIAGNOSIS — E86.0 DEHYDRATION: ICD-10-CM

## 2022-11-28 DIAGNOSIS — M54.10 RADICULOPATHY, SITE UNSPECIFIED: ICD-10-CM

## 2022-11-28 DIAGNOSIS — E11.9 TYPE 2 DIABETES MELLITUS WITHOUT COMPLICATIONS: ICD-10-CM

## 2022-11-28 DIAGNOSIS — D63.0 ANEMIA IN NEOPLASTIC DISEASE: ICD-10-CM

## 2022-11-28 DIAGNOSIS — N39.0 URINARY TRACT INFECTION, SITE NOT SPECIFIED: ICD-10-CM

## 2022-11-28 DIAGNOSIS — A41.89 OTHER SPECIFIED SEPSIS: ICD-10-CM

## 2022-11-28 DIAGNOSIS — Z66 DO NOT RESUSCITATE: ICD-10-CM

## 2022-11-28 DIAGNOSIS — E43 UNSPECIFIED SEVERE PROTEIN-CALORIE MALNUTRITION: ICD-10-CM

## 2022-11-28 DIAGNOSIS — B97.4 RESPIRATORY SYNCYTIAL VIRUS AS THE CAUSE OF DISEASES CLASSIFIED ELSEWHERE: ICD-10-CM

## 2022-11-28 DIAGNOSIS — R59.0 LOCALIZED ENLARGED LYMPH NODES: ICD-10-CM

## 2022-11-28 DIAGNOSIS — G93.41 METABOLIC ENCEPHALOPATHY: ICD-10-CM

## 2022-11-28 DIAGNOSIS — C81.90 HODGKIN LYMPHOMA, UNSPECIFIED, UNSPECIFIED SITE: ICD-10-CM

## 2022-11-28 DIAGNOSIS — R16.0 HEPATOMEGALY, NOT ELSEWHERE CLASSIFIED: ICD-10-CM

## 2023-09-14 NOTE — ED ADULT NURSE NOTE - SUICIDE SCREENING DEPRESSION
Quality 130: Documentation Of Current Medications In The Medical Record: Current Medications Documented
Detail Level: Detailed
Negative
Quality 226: Preventive Care And Screening: Tobacco Use: Screening And Cessation Intervention: Patient screened for tobacco use and is an ex/non-smoker

## 2023-09-20 NOTE — SWALLOW BEDSIDE ASSESSMENT ADULT - SWALLOW EVAL: RECOMMENDED DIET
SUGGEST AN EASY TO CHEW DIET WITH THIN LIQUID CONSISTENCIES AS PT APPEARED CLINICALLY TOLERANT OF THESE FOOD TEXTURES FROM AN OROPHARYNGEAL SWALLOWING STANCE ON EXAM AND FOOD CONSISTENCIES ON THIS DIET ACCOMMODATES PT'S EXPRESSED FOOD PREFERENCES. Quality 402: Tobacco Use And Help With Quitting Among Adolescents: Patient screened for tobacco and never smoked Detail Level: Detailed Quality 431: Preventive Care And Screening: Unhealthy Alcohol Use - Screening: Patient not identified as an unhealthy alcohol user when screened for unhealthy alcohol use using a systematic screening method